# Patient Record
Sex: FEMALE | Race: OTHER | HISPANIC OR LATINO
[De-identification: names, ages, dates, MRNs, and addresses within clinical notes are randomized per-mention and may not be internally consistent; named-entity substitution may affect disease eponyms.]

---

## 2020-02-28 ENCOUNTER — APPOINTMENT (OUTPATIENT)
Dept: GASTROENTEROLOGY | Facility: CLINIC | Age: 45
End: 2020-02-28
Payer: COMMERCIAL

## 2020-02-28 VITALS
WEIGHT: 194 LBS | BODY MASS INDEX: 32.32 KG/M2 | DIASTOLIC BLOOD PRESSURE: 95 MMHG | SYSTOLIC BLOOD PRESSURE: 163 MMHG | HEIGHT: 65 IN | HEART RATE: 72 BPM

## 2020-02-28 DIAGNOSIS — Z80.49 FAMILY HISTORY OF MALIGNANT NEOPLASM OF OTHER GENITAL ORGANS: ICD-10-CM

## 2020-02-28 DIAGNOSIS — Z80.3 FAMILY HISTORY OF MALIGNANT NEOPLASM OF BREAST: ICD-10-CM

## 2020-02-28 DIAGNOSIS — Z86.79 PERSONAL HISTORY OF OTHER DISEASES OF THE CIRCULATORY SYSTEM: ICD-10-CM

## 2020-02-28 PROCEDURE — 99204 OFFICE O/P NEW MOD 45 MIN: CPT

## 2020-02-28 NOTE — ASSESSMENT
[FreeTextEntry1] : Impression: Worsening chronic GERD with postprandial dyspepsia rule out reflux of Heath's esophagitis versus ulcer disease versus gastritis versus possible H. pylori infection. Chronic constipation without alarm symptoms.\par \par Recommendations: MiraLax once daily was recommended for initial treatment of her constipation pending upper endoscopy which was advised for further evaluation of her chronic worsening GERD. The risks versus benefits of upper endoscopy and intravenous sedation, and alternative testing such as upper GI series, were individually explained to the patient in Thai by myself who speaks Thai. She appeared to understand all of the above and was agreeable to proceeding with repeat upper endoscopy. Her ASA classification is 2 and she is medically optimized for the proposed upper endoscopy and appeared to understand all of the above instructions and management plan.\par

## 2020-02-28 NOTE — HISTORY OF PRESENT ILLNESS
[None] : had no significant interval events [Heartburn] : heartburn worsened [Nausea] : denies nausea [Vomiting] : denies vomiting [Diarrhea] : denies diarrhea [Yellow Skin Or Eyes (Jaundice)] : denies jaundice [Abdominal Swelling] : denies abdominal swelling [Rectal Pain] : denies rectal pain [Constipation] : constipation [Abdominal Pain] : abdominal pain [GERD] : gastroesophageal reflux disease [Cholelithiasis] : cholelithiasis [Abdominal Surgery] : abdominal surgery [Cholecystectomy] : cholecystectomy [Wt Gain ___ Lbs] : no recent weight gain [Wt Loss ___ Lbs] : no recent weight loss [Hiatus Hernia] : no hiatus hernia [Peptic Ulcer Disease] : no peptic ulcer disease [Pancreatitis] : no pancreatitis [Kidney Stone] : no kidney stone [Inflammatory Bowel Disease] : no inflammatory bowel disease [Irritable Bowel Syndrome] : no irritable bowel syndrome [Diverticulitis] : no diverticulitis [Alcohol Abuse] : no alcohol abuse [Malignancy] : no malignancy [Appendectomy] : no appendectomy [de-identified] : this is the patient's first visit here [de-identified] : Patient presents today for initial evaluation of chronic worsening GERD which has been present for the past 4-6 months despite taking pantoprazole once daily. Her last EGD was a few years ago prior to gastric sleeve surgery which was unremarkable. As a result of gastric sleeve surgery she lost roughly 100 pounds and her reflux was naturally much better. Lately she has regained roughly 27 pounds and has had worsening reflux. She also complains of upper abdominal pain and bloating after eating.In addition she also has been having new onset constipation but denies gross hematochezia or melena and has no family history of first-degree relatives with colon cancer or colon polyps.

## 2020-02-28 NOTE — REVIEW OF SYSTEMS
[Negative] : Heme/Lymph [As Noted in HPI] : as noted in HPI [Abdominal Pain] : abdominal pain [Constipation] : constipation [Heartburn] : heartburn [Vomiting] : no vomiting [Diarrhea] : no diarrhea [Melena] : no melena [FreeTextEntry7] : postprandial dyspepsia

## 2020-02-28 NOTE — PHYSICAL EXAM
[General Appearance - Alert] : alert [General Appearance - In No Acute Distress] : in no acute distress [General Appearance - Well Nourished] : well nourished [General Appearance - Well Developed] : well developed [General Appearance - Well-Appearing] : healthy appearing [Sclera] : the sclera and conjunctiva were normal [PERRL With Normal Accommodation] : pupils were equal in size, round, and reactive to light [Extraocular Movements] : extraocular movements were intact [Outer Ear] : the ears and nose were normal in appearance [Examination Of The Oral Cavity] : the lips and gums were normal [Oropharynx] : the oropharynx was normal [Neck Appearance] : the appearance of the neck was normal [Neck Cervical Mass (___cm)] : no neck mass was observed [Jugular Venous Distention Increased] : there was no jugular-venous distention [Thyroid Diffuse Enlargement] : the thyroid was not enlarged [Thyroid Nodule] : there were no palpable thyroid nodules [Auscultation Breath Sounds / Voice Sounds] : lungs were clear to auscultation bilaterally [Heart Rate And Rhythm] : heart rate was normal and rhythm regular [Heart Sounds] : normal S1 and S2 [Heart Sounds Gallop] : no gallops [Murmurs] : no murmurs [Heart Sounds Pericardial Friction Rub] : no pericardial rub [Bowel Sounds] : normal bowel sounds [Abdomen Soft] : soft [] : no hepato-splenomegaly [Abdomen Mass (___ Cm)] : no abdominal mass palpated [Epigastric] : in the epigastric area [No CVA Tenderness] : no ~M costovertebral angle tenderness [Abnormal Walk] : normal gait [Musculoskeletal - Swelling] : no joint swelling seen [Skin Color & Pigmentation] : normal skin color and pigmentation [Skin Turgor] : normal skin turgor [Oriented To Time, Place, And Person] : oriented to person, place, and time [Periumbilical] : not in the periumbilical area [Suprapubic] : not in the suprapubic area [RUQ] : not in the in the right upper quadrant [RLQ] : not in the right lower quadrant [LUQ] : not in the left upper quadrant [LLQ] : not in the left lower quadrant [FreeTextEntry1] : deferred at this time

## 2020-03-27 ENCOUNTER — APPOINTMENT (OUTPATIENT)
Dept: GASTROENTEROLOGY | Facility: GI CENTER | Age: 45
End: 2020-03-27

## 2021-08-24 ENCOUNTER — RESULT REVIEW (OUTPATIENT)
Age: 46
End: 2021-08-24

## 2022-04-20 ENCOUNTER — EMERGENCY (EMERGENCY)
Facility: HOSPITAL | Age: 47
LOS: 1 days | Discharge: HOME | End: 2022-04-20
Attending: EMERGENCY MEDICINE
Payer: MEDICAID

## 2022-04-20 VITALS
HEIGHT: 66 IN | RESPIRATION RATE: 20 BRPM | TEMPERATURE: 98 F | HEART RATE: 79 BPM | SYSTOLIC BLOOD PRESSURE: 138 MMHG | DIASTOLIC BLOOD PRESSURE: 88 MMHG | WEIGHT: 199.96 LBS | OXYGEN SATURATION: 98 %

## 2022-04-20 PROCEDURE — 99283 EMERGENCY DEPT VISIT LOW MDM: CPT

## 2022-04-20 PROCEDURE — 99282 EMERGENCY DEPT VISIT SF MDM: CPT

## 2022-04-20 NOTE — ED PROVIDER NOTE - ATTENDING APP SHARED VISIT CONTRIBUTION OF CARE
Dr. Lovett : I have personally seen and examined this patient at the bedside. I have fully participated in the care of this patient. I have reviewed all pertinent clinical information, including history, physical exam, plan and the PA's note and agree except as noted.   45 y/o F hx htn presents c/o pain/swelling to the back of neck x 2 weeks. pt tried ibuprofen at home w/o much relief.       Denies f/c/n/v/cp/sob/palpitations/cough/abd.pain/d/c/dysuria/hematuria. no  sick contacts/recent travel.    PE:  head; atraumatic normocephalic  neck: +mobile firm ttp cyst to the back of the neck on right side no erythema no increased warmth  eyes: perrla  Heart: rrr s1s2  lungs: ctab  abd: soft, nt nd + bs no rebound/guarding no cva ttp  le: no swelling no calf ttp  back: no midline cervical/thoracic/lumbar ttp      -->+cyst no erythema no warmth; most likely sebacious cyst based on bedside sono findings no abscess collection will advise warm compresses pain control surg or derm follow up for removal

## 2022-04-20 NOTE — ED PROVIDER NOTE - CARE PROVIDER_API CALL
Zac Everett)  Surgery  [address], [address]  [place] [address]  Phone: [phone]  Fax: [phone]  Follow Up Time:

## 2022-04-20 NOTE — ED PROVIDER NOTE - CLINICAL SUMMARY MEDICAL DECISION MAKING FREE TEXT BOX
pt with swelling/pain to posterior neck  bedside US shows sebaceous cyst  advised warm compresses, ibuprofen prn, f/u dermatology or surgeon for removal  pt agreeable with plan

## 2022-04-20 NOTE — ED PROVIDER NOTE - OBJECTIVE STATEMENT
47 y/o F hx htn presents c/o pain/swelling to posterior neck x 2 weeks causing pain and chills. pt tried ibuprofen at home w/o much relief. denies fevers, vomiting.

## 2022-04-20 NOTE — ED PROVIDER NOTE - NSFOLLOWUPCLINICS_GEN_ALL_ED_FT
[facility] - [place]  Dermatology  [address] [place] [id number]  Phone: [phone]  Fax: [phone]

## 2022-04-20 NOTE — ED PROVIDER NOTE - PATIENT PORTAL LINK FT
You can access the FollowMyHealth Patient Portal offered by [facility] by registering at the following website: [email or web address]. By joining [email or web address]’s FollowMyHealth portal, you will also be able to view your health information using other applications (apps) compatible with our system.

## 2022-04-20 NOTE — ED PROVIDER NOTE - NSFOLLOWUPINSTRUCTIONS_ED_ALL_ED_FT
Please take ibuprofen 600mg every 6 hours as needed for pain  Please take tylenol 650mg every 6hours as needed for pain  Apply warm compresses to area  Follow up with dermatologist or surgeon for removal  Return to ER for new or worsening symptoms    Homer C Jones ibuprofeno 600 mg cada 6 horas según sea necesario para el dolor  Homer C Jones tylenol 650 mg cada 6 horas según sea necesario para el dolor  Aplique compresas tibias en el área  Seguimiento con dermatólogo o cirujano para la extracción  Regrese a la hernán de emergencias por síntomas nuevos o que empeoran

## 2022-04-20 NOTE — ED PROCEDURE NOTE - PROCEDURE ADDITIONAL DETAILS
0.6cmx0.7xmx0.8cm round regular borders cystic structure with debris in the right side of the neck a little off midline - most consistent with sebacious cyst

## 2022-04-20 NOTE — ED PROVIDER NOTE - NS ED ATTENDING STATEMENT MOD
This was a shared visit with the JERONIMO. I reviewed and verified the documentation and independently performed the documented:

## 2022-04-20 NOTE — ED PROVIDER NOTE - PHYSICAL EXAMINATION
Gen: No acute distress, non toxic  HEENT: Mucous membranes moist, pink conjunctivae, EOMI  CV: RRR, nl s1/s2.  Resp: CTAB, normal rate and effort  GI: Abdomen soft, NT, ND. No rebound, no guarding  : No CVAT  Neuro: A&O x 3, moving all 4 extremities  MSK: No spine or joint tenderness to palpation  Skin: 0.8cm area of swelling right posterior neck, tender to palpation, no erythema/warmth, neck supple

## 2022-05-17 ENCOUNTER — APPOINTMENT (OUTPATIENT)
Dept: GASTROENTEROLOGY | Facility: CLINIC | Age: 47
End: 2022-05-17

## 2022-07-06 ENCOUNTER — APPOINTMENT (OUTPATIENT)
Dept: GASTROENTEROLOGY | Facility: CLINIC | Age: 47
End: 2022-07-06

## 2022-07-13 ENCOUNTER — APPOINTMENT (OUTPATIENT)
Dept: GASTROENTEROLOGY | Facility: CLINIC | Age: 47
End: 2022-07-13

## 2022-07-13 VITALS
WEIGHT: 192 LBS | BODY MASS INDEX: 31.99 KG/M2 | OXYGEN SATURATION: 98 % | RESPIRATION RATE: 14 BRPM | DIASTOLIC BLOOD PRESSURE: 79 MMHG | HEART RATE: 81 BPM | SYSTOLIC BLOOD PRESSURE: 126 MMHG | HEIGHT: 65 IN

## 2022-07-13 PROCEDURE — 99214 OFFICE O/P EST MOD 30 MIN: CPT

## 2022-07-13 NOTE — REVIEW OF SYSTEMS
[As Noted in HPI] : as noted in HPI [Abdominal Pain] : abdominal pain [Heartburn] : heartburn [Negative] : Heme/Lymph [Vomiting] : no vomiting [Constipation] : no constipation [Diarrhea] : no diarrhea [Melena] : no melena [FreeTextEntry7] : Postprandial dyspepsia

## 2022-07-13 NOTE — ASSESSMENT
[FreeTextEntry1] : Impression: Postprandial dyspepsia with chronic GERD rule out reflux of Heath's esophagitis and/or gastritis and/or ulcer disease and/or H. pylori infection.  Colon cancer screening rule out colonic neoplasm.\par \par Recommendations: EGD and low risk screening colonoscopy were advised for further evaluation of the above.  The risk versus benefits of upper endoscopy and colonoscopy and intravenous sedation, and alternative testing such as upper GI series virtual colonoscopy, were individually explained to the patient today in Romanian by myself who speaks Romanian.  She appeared to understand all of the above and was agreeable to proceeding with both procedures.  Her ASA classification is 2.  She will be prepped with GoLytely and is medically optimized for the proposed upper endoscopy and colonoscopy and seemed to understand all of the above instructions, information, and management plan.

## 2022-07-13 NOTE — HISTORY OF PRESENT ILLNESS
[Heartburn] : heartburn worsened [Nausea] : denies nausea [Vomiting] : denies vomiting [Diarrhea] : denies diarrhea [Constipation] : denies constipation [Yellow Skin Or Eyes (Jaundice)] : denies jaundice [Abdominal Pain] : abdominal pain worsened [Abdominal Swelling] : denies abdominal swelling [Rectal Pain] : denies rectal pain [GERD] : gastroesophageal reflux disease [Cholelithiasis] : cholelithiasis [Abdominal Surgery] : abdominal surgery [Cholecystectomy] : cholecystectomy [Wt Gain ___ Lbs] : no recent weight gain [Wt Loss ___ Lbs] : no recent weight loss [Hiatus Hernia] : no hiatus hernia [Peptic Ulcer Disease] : no peptic ulcer disease [Pancreatitis] : no pancreatitis [Kidney Stone] : no kidney stone [Inflammatory Bowel Disease] : no inflammatory bowel disease [Irritable Bowel Syndrome] : no irritable bowel syndrome [Diverticulitis] : no diverticulitis [Alcohol Abuse] : no alcohol abuse [Malignancy] : no malignancy [Appendectomy] : no appendectomy [de-identified] : February 2020 [de-identified] : Recommending EGD [de-identified] : COVID pandemic [de-identified] : Patient presents for follow-up evaluation of chronic postprandial dyspepsia which has been present for at least the last 2 years.  She was seen here in February 2020 for this problem and upper endoscopy was recommended but not done because of the COVID pandemic.  She returns today with the same problem with postprandial bloating and dyspepsia and also is in need of a screening colonoscopy given that she is 47 years old.  She has had no previous upper endoscopies and states that she had a colonoscopy done more than 10 years ago for lower GI symptoms.  She does have some intermittent reflux symptoms as well after eating

## 2022-07-26 ENCOUNTER — EMERGENCY (EMERGENCY)
Facility: HOSPITAL | Age: 47
LOS: 1 days | Discharge: HOME | End: 2022-07-26
Attending: STUDENT IN AN ORGANIZED HEALTH CARE EDUCATION/TRAINING PROGRAM
Payer: COMMERCIAL

## 2022-07-26 VITALS
DIASTOLIC BLOOD PRESSURE: 60 MMHG | RESPIRATION RATE: 20 BRPM | TEMPERATURE: 98 F | HEIGHT: 66 IN | HEART RATE: 73 BPM | WEIGHT: 199.96 LBS | OXYGEN SATURATION: 98 % | SYSTOLIC BLOOD PRESSURE: 96 MMHG

## 2022-07-26 VITALS
HEART RATE: 60 BPM | DIASTOLIC BLOOD PRESSURE: 78 MMHG | RESPIRATION RATE: 20 BRPM | TEMPERATURE: 98 F | SYSTOLIC BLOOD PRESSURE: 125 MMHG | OXYGEN SATURATION: 98 %

## 2022-07-26 LAB
ALBUMIN SERPL ELPH-MCNC: 3.8 G/DL — SIGNIFICANT CHANGE UP (ref 3.3–5.2)
ALP SERPL-CCNC: 91 U/L — SIGNIFICANT CHANGE UP (ref 40–120)
ALT FLD-CCNC: 17 U/L — SIGNIFICANT CHANGE UP
ANION GAP SERPL CALC-SCNC: 10 MMOL/L — SIGNIFICANT CHANGE UP (ref 5–17)
APPEARANCE UR: ABNORMAL
AST SERPL-CCNC: 24 U/L — SIGNIFICANT CHANGE UP
BACTERIA # UR AUTO: ABNORMAL
BASOPHILS # BLD AUTO: 0.03 K/UL — SIGNIFICANT CHANGE UP (ref 0–0.2)
BASOPHILS NFR BLD AUTO: 0.3 % — SIGNIFICANT CHANGE UP (ref 0–2)
BILIRUB SERPL-MCNC: <0.2 MG/DL — LOW (ref 0.4–2)
BILIRUB UR-MCNC: NEGATIVE — SIGNIFICANT CHANGE UP
BUN SERPL-MCNC: 18.3 MG/DL — SIGNIFICANT CHANGE UP (ref 8–20)
CALCIUM SERPL-MCNC: 8.5 MG/DL — SIGNIFICANT CHANGE UP (ref 8.4–10.5)
CHLORIDE SERPL-SCNC: 105 MMOL/L — SIGNIFICANT CHANGE UP (ref 98–107)
CO2 SERPL-SCNC: 25 MMOL/L — SIGNIFICANT CHANGE UP (ref 22–29)
COLOR SPEC: YELLOW — SIGNIFICANT CHANGE UP
CREAT SERPL-MCNC: 1.1 MG/DL — SIGNIFICANT CHANGE UP (ref 0.5–1.3)
DIFF PNL FLD: ABNORMAL
EGFR: 63 ML/MIN/1.73M2 — SIGNIFICANT CHANGE UP
EOSINOPHIL # BLD AUTO: 0.22 K/UL — SIGNIFICANT CHANGE UP (ref 0–0.5)
EOSINOPHIL NFR BLD AUTO: 1.8 % — SIGNIFICANT CHANGE UP (ref 0–6)
EPI CELLS # UR: SIGNIFICANT CHANGE UP
GLUCOSE SERPL-MCNC: 74 MG/DL — SIGNIFICANT CHANGE UP (ref 70–99)
GLUCOSE UR QL: NEGATIVE MG/DL — SIGNIFICANT CHANGE UP
HCG SERPL-ACNC: <4 MIU/ML — SIGNIFICANT CHANGE UP
HCT VFR BLD CALC: 34.5 % — SIGNIFICANT CHANGE UP (ref 34.5–45)
HGB BLD-MCNC: 11.1 G/DL — LOW (ref 11.5–15.5)
IMM GRANULOCYTES NFR BLD AUTO: 0.3 % — SIGNIFICANT CHANGE UP (ref 0–1.5)
KETONES UR-MCNC: NEGATIVE — SIGNIFICANT CHANGE UP
LEUKOCYTE ESTERASE UR-ACNC: ABNORMAL
LIDOCAIN IGE QN: 37 U/L — SIGNIFICANT CHANGE UP (ref 22–51)
LYMPHOCYTES # BLD AUTO: 27.4 % — SIGNIFICANT CHANGE UP (ref 13–44)
LYMPHOCYTES # BLD AUTO: 3.27 K/UL — SIGNIFICANT CHANGE UP (ref 1–3.3)
MAGNESIUM SERPL-MCNC: 2.1 MG/DL — SIGNIFICANT CHANGE UP (ref 1.6–2.6)
MCHC RBC-ENTMCNC: 26.2 PG — LOW (ref 27–34)
MCHC RBC-ENTMCNC: 32.2 GM/DL — SIGNIFICANT CHANGE UP (ref 32–36)
MCV RBC AUTO: 81.6 FL — SIGNIFICANT CHANGE UP (ref 80–100)
MONOCYTES # BLD AUTO: 0.75 K/UL — SIGNIFICANT CHANGE UP (ref 0–0.9)
MONOCYTES NFR BLD AUTO: 6.3 % — SIGNIFICANT CHANGE UP (ref 2–14)
NEUTROPHILS # BLD AUTO: 7.62 K/UL — HIGH (ref 1.8–7.4)
NEUTROPHILS NFR BLD AUTO: 63.9 % — SIGNIFICANT CHANGE UP (ref 43–77)
NITRITE UR-MCNC: NEGATIVE — SIGNIFICANT CHANGE UP
PH UR: 5 — SIGNIFICANT CHANGE UP (ref 5–8)
PLATELET # BLD AUTO: 378 K/UL — SIGNIFICANT CHANGE UP (ref 150–400)
POTASSIUM SERPL-MCNC: 4.3 MMOL/L — SIGNIFICANT CHANGE UP (ref 3.5–5.3)
POTASSIUM SERPL-SCNC: 4.3 MMOL/L — SIGNIFICANT CHANGE UP (ref 3.5–5.3)
PROT SERPL-MCNC: 6.3 G/DL — LOW (ref 6.6–8.7)
PROT UR-MCNC: 30 MG/DL
RBC # BLD: 4.23 M/UL — SIGNIFICANT CHANGE UP (ref 3.8–5.2)
RBC # FLD: 17 % — HIGH (ref 10.3–14.5)
RBC CASTS # UR COMP ASSIST: SIGNIFICANT CHANGE UP /HPF (ref 0–4)
SODIUM SERPL-SCNC: 140 MMOL/L — SIGNIFICANT CHANGE UP (ref 135–145)
SP GR SPEC: 1.01 — SIGNIFICANT CHANGE UP (ref 1.01–1.02)
UROBILINOGEN FLD QL: NEGATIVE MG/DL — SIGNIFICANT CHANGE UP
WBC # BLD: 11.92 K/UL — HIGH (ref 3.8–10.5)
WBC # FLD AUTO: 11.92 K/UL — HIGH (ref 3.8–10.5)
WBC UR QL: ABNORMAL /HPF (ref 0–5)

## 2022-07-26 PROCEDURE — 81001 URINALYSIS AUTO W/SCOPE: CPT

## 2022-07-26 PROCEDURE — 99285 EMERGENCY DEPT VISIT HI MDM: CPT

## 2022-07-26 PROCEDURE — 84702 CHORIONIC GONADOTROPIN TEST: CPT

## 2022-07-26 PROCEDURE — 83690 ASSAY OF LIPASE: CPT

## 2022-07-26 PROCEDURE — 96374 THER/PROPH/DIAG INJ IV PUSH: CPT | Mod: XU

## 2022-07-26 PROCEDURE — 36415 COLL VENOUS BLD VENIPUNCTURE: CPT

## 2022-07-26 PROCEDURE — 85025 COMPLETE CBC W/AUTO DIFF WBC: CPT

## 2022-07-26 PROCEDURE — 87086 URINE CULTURE/COLONY COUNT: CPT

## 2022-07-26 PROCEDURE — 83735 ASSAY OF MAGNESIUM: CPT

## 2022-07-26 PROCEDURE — 74177 CT ABD & PELVIS W/CONTRAST: CPT | Mod: MA

## 2022-07-26 PROCEDURE — 80053 COMPREHEN METABOLIC PANEL: CPT

## 2022-07-26 PROCEDURE — 74177 CT ABD & PELVIS W/CONTRAST: CPT | Mod: 26,MA

## 2022-07-26 PROCEDURE — 99284 EMERGENCY DEPT VISIT MOD MDM: CPT | Mod: 25

## 2022-07-26 RX ORDER — IBUPROFEN 200 MG
1 TABLET ORAL
Qty: 20 | Refills: 0
Start: 2022-07-26

## 2022-07-26 RX ORDER — KETOROLAC TROMETHAMINE 30 MG/ML
30 SYRINGE (ML) INJECTION ONCE
Refills: 0 | Status: DISCONTINUED | OUTPATIENT
Start: 2022-07-26 | End: 2022-07-26

## 2022-07-26 RX ORDER — CEPHALEXIN 500 MG
500 CAPSULE ORAL EVERY 12 HOURS
Refills: 0 | Status: DISCONTINUED | OUTPATIENT
Start: 2022-07-26 | End: 2022-07-31

## 2022-07-26 RX ORDER — CEPHALEXIN 500 MG
1 CAPSULE ORAL
Qty: 14 | Refills: 0
Start: 2022-07-26 | End: 2022-08-01

## 2022-07-26 RX ADMIN — Medication 500 MILLIGRAM(S): at 18:00

## 2022-07-26 RX ADMIN — Medication 30 MILLIGRAM(S): at 18:00

## 2022-07-26 NOTE — ED STATDOCS - PATIENT PORTAL LINK FT
You can access the FollowMyHealth Patient Portal offered by [facility] by registering at the following website: [email or web address]. By joining [employer]’s FollowMyHealth portal, you will also be able to view your health information using other applications (apps) compatible with our system.

## 2022-07-26 NOTE — ED ADULT TRIAGE NOTE - HEART RATE (BEATS/MIN)
Patient has appt tomorrow 1/13/2017 needs a referral put in system  And faxed to :[phone]    [facility]  Dx: eye exam, hx of melanoma    74

## 2022-07-26 NOTE — ED STATDOCS - NSFOLLOWUPINSTRUCTIONS_ED_ALL_ED_FT
Please take antibiotics as prescribed for UTI  Please take ibuprofen 600mg every 6 hours as needed for pain  Please take tylenol 650mg every 6hours as needed for pain  Follow up with gynecologist within 1 week  Return to ER for fevers, worsening pain, vomiting, or other new or worsening symptoms        Ovarian Cyst    WHAT YOU NEED TO KNOW:    What is an ovarian cyst? An ovarian cyst is a fluid-filled sac that grows in or on an ovary. You have 2 ovaries, 1 on each side of your uterus. They are small, about the size and shape of an almond. Ovarian cysts are common in women who have regular monthly cycles. During your monthly cycle, eggs are released from the ovaries. The cyst usually contains fluid but may sometimes have blood or tissue in it. Most ovarian cysts are harmless and go away without treatment in a few months. Some cysts can grow large, cause pain, or break open.   Female Reproductive System         What causes an ovarian cyst?   •Problems with your hormones      •Medicines that help you ovulate      •Endometriosis      •Pregnancy      •A severe infection in your pelvis      What are the signs and symptoms of an ovarian cyst? You may have pressure, bloating or swelling in your lower abdomen on the side of the cyst. You may also have dull or sharp pain that may come and go. The following are less common signs and symptoms:   •A dull ache in your lower back and thighs      •Unusual vaginal bleeding      •Weight gain you did not expect or plan      •Pain during your monthly cycle      •Tenderness in your breasts      •Trouble completely emptying your bowels or bladder      •The need to urinate often      •Pain during sex      How is an ovarian cyst diagnosed?   •Blood tests may show what type of cyst you have and if you need treatment.      •A pelvic exam may help your healthcare provider feel an ovarian cyst.      •A pelvic ultrasound may show a cyst on your ovary. An ultrasound wand uses sound waves to show pictures on a monitor. The wand is inserted into your vagina and guided up toward your uterus.       How is an ovarian cyst treated? Treatment will depend on your age, symptoms, and the kind of cyst you have. You may need any of the following:   •Watchful waiting may be recommended. This means the cyst is not treated right away. You will need to watch for any signs or symptoms that the cyst is growing. You may need to return for one or more ultrasounds after a certain period of time. These will show if your cyst has changed in size.      •Medicines: ?Birth control pills may help control your monthly cycle, prevent cysts, or cause them to shrink.      ?Acetaminophen decreases pain and fever. It is available without a doctor's order. Ask how much to take and how often to take it. Follow directions. Read the labels of all other medicines you are using to see if they also contain acetaminophen, or ask your doctor or pharmacist. Acetaminophen can cause liver damage if not taken correctly.      ?NSAIDs, such as ibuprofen, help decrease swelling, pain, and fever. This medicine is available with or without a doctor's order. NSAIDs can cause stomach bleeding or kidney problems in certain people. If you take blood thinner medicine, always ask your healthcare provider if NSAIDs are safe for you. Always read the medicine label and follow directions.      ?Prescription pain medicine may be given. Ask your healthcare provider how to take this medicine safely. Some prescription pain medicines contain acetaminophen. Do not take other medicines that contain acetaminophen without talking to your healthcare provider. Too much acetaminophen may cause liver damage. Prescription pain medicine may cause constipation. Ask your healthcare provider how to prevent or treat constipation.       •Surgery may be needed to remove the ovarian cyst.      How can I manage ovarian cysts? You can manage a current cyst and help healthcare providers find future cysts early.  •Apply heat to decrease pain and cramping from a cyst. Sit in a warm bath, or place a heating pad (turned on low) on your abdomen. Do this for 15 to 20 minutes every hour for comfort.      •Get regular pelvic exams or Pap smears. This will help providers find any new ovarian cysts. Tell your healthcare provider about any unusual changes in your monthly cycle.      Call your local emergency number (911 in the [place]) if:   •You have severe pain with fever and vomiting.      •You have sudden, severe abdominal pain.      •You are too weak, faint, or dizzy to stand up.      •You are breathing very quickly.      When should I call my doctor?   •Your periods are early, late, or more painful than usual.      •You have questions or concerns about your condition or care.      CARE AGREEMENT:    You have the right to help plan your care. Learn about your health condition and how it may be treated. Discuss treatment options with your healthcare providers to decide what care you want to receive. You always have the right to refuse treatment.

## 2022-07-26 NOTE — ED STATDOCS - OBJECTIVE STATEMENT
46 y/o female with PMHx of HTN presents with sudden onset of suprapubic pain for three days, and cloudy urine that started today. Describes the pain as if she is going to have a baby. LMP two months ago. Denies pregnancy. PSHx of cholecystectomy, hernia repair, tubal ligation, bariatric surgery. Pt denies fevers/chills, ha, loc, focal neuro deficits, cp/sob/palp, cough, abd pain/n/v/d, urinary symptoms, recent travel and sick contacts.

## 2022-07-26 NOTE — ED STATDOCS - CARE PROVIDER_API CALL
Mor Chase)  Obstetrics and Gynecology  [address], [address]  [place] [address]  Phone: [phone]  Fax: [phone]  Follow Up Time:

## 2022-07-26 NOTE — ED STATDOCS - CLINICAL SUMMARY MEDICAL DECISION MAKING FREE TEXT BOX
48 y/o female with PMHx of HTN presents with sudden onset of suprapubic pain for three days, and cloudy urine that started today. Rule out diverticulitis, as well as UTI. Will do labs, UA, CT.

## 2022-07-26 NOTE — ED STATDOCS - PHYSICAL EXAMINATION
Const: Awake, alert and oriented. In no acute distress. Well appearing.  HEENT: NC/AT. Moist mucous membranes.  Eyes: No scleral icterus. EOMI.  Neck:. Soft and supple. Full ROM without pain.  Cardiac: Regular rate and regular rhythm. +S1/S2. Peripheral pulses 2+ and symmetric. No LE edema.  Resp: Speaking in full sentences. No evidence of respiratory distress. No wheezes, rales or rhonchi.  Abd: (+) LLQ and suprapubic TTP. Soft, non-distended. Normal bowel sounds in all 4 quadrants. No guarding or rebound.  Back: Spine midline and non-tender. No CVAT.  Skin: No rashes, abrasions or lacerations.  Lymph: No cervical lymphadenopathy.  Neuro: Awake, alert & oriented x 3. Moves all extremities symmetrically.

## 2022-07-26 NOTE — ED STATDOCS - PROGRESS NOTE DETAILS
Domonique Oro PA :  PT evaluated by intake physician. HPI/PE/ROS as noted above. Will follow up plan per intake physician  ?UTI  CT with b/l ovarian cysts  pt feeling better after toradol  will dc on abx for UTI and nsaids and f/u with GYN  return precautions discussed   oz

## 2022-07-28 LAB
CULTURE RESULTS: SIGNIFICANT CHANGE UP
SPECIMEN SOURCE: SIGNIFICANT CHANGE UP

## 2022-09-04 ENCOUNTER — EMERGENCY (EMERGENCY)
Facility: HOSPITAL | Age: 47
LOS: 1 days | Discharge: HOME | End: 2022-09-04
Attending: EMERGENCY MEDICINE
Payer: COMMERCIAL

## 2022-09-04 VITALS
RESPIRATION RATE: 16 BRPM | SYSTOLIC BLOOD PRESSURE: 130 MMHG | HEART RATE: 69 BPM | DIASTOLIC BLOOD PRESSURE: 87 MMHG | OXYGEN SATURATION: 100 % | TEMPERATURE: 98 F

## 2022-09-04 VITALS
WEIGHT: 179.9 LBS | HEIGHT: 66 IN | SYSTOLIC BLOOD PRESSURE: 137 MMHG | RESPIRATION RATE: 16 BRPM | TEMPERATURE: 98 F | HEART RATE: 89 BPM | DIASTOLIC BLOOD PRESSURE: 93 MMHG | OXYGEN SATURATION: 100 %

## 2022-09-04 PROCEDURE — 99284 EMERGENCY DEPT VISIT MOD MDM: CPT | Mod: 25

## 2022-09-04 PROCEDURE — 99284 EMERGENCY DEPT VISIT MOD MDM: CPT

## 2022-09-04 PROCEDURE — 96372 THER/PROPH/DIAG INJ SC/IM: CPT

## 2022-09-04 PROCEDURE — 70450 CT HEAD/BRAIN W/O DYE: CPT | Mod: MA

## 2022-09-04 PROCEDURE — 70450 CT HEAD/BRAIN W/O DYE: CPT | Mod: 26,MA

## 2022-09-04 RX ORDER — SUMATRIPTAN SUCCINATE 4 MG/.5ML
50 INJECTION, SOLUTION SUBCUTANEOUS ONCE
Refills: 0 | Status: COMPLETED | OUTPATIENT
Start: 2022-09-04 | End: 2022-09-04

## 2022-09-04 RX ORDER — KETOROLAC TROMETHAMINE 30 MG/ML
30 SYRINGE (ML) INJECTION ONCE
Refills: 0 | Status: DISCONTINUED | OUTPATIENT
Start: 2022-09-04 | End: 2022-09-04

## 2022-09-04 RX ORDER — ACETAMINOPHEN 500 MG
975 TABLET ORAL ONCE
Refills: 0 | Status: COMPLETED | OUTPATIENT
Start: 2022-09-04 | End: 2022-09-04

## 2022-09-04 RX ADMIN — SUMATRIPTAN SUCCINATE 50 MILLIGRAM(S): 4 INJECTION, SOLUTION SUBCUTANEOUS at 13:26

## 2022-09-04 RX ADMIN — Medication 30 MILLIGRAM(S): at 13:28

## 2022-09-04 RX ADMIN — Medication 975 MILLIGRAM(S): at 13:26

## 2022-09-04 NOTE — ED STATDOCS - PATIENT PORTAL LINK FT
You can access the FollowMyHealth Patient Portal offered by [facility] by registering at the following website: [email or web address]. By joining [employer]’s FollowMyHealth portal, you will also be able to view your health information using other applications (apps) compatible with our system.

## 2022-09-04 NOTE — ED STATDOCS - ENMT, MLM
Nasal mucosa clear.  Mouth with normal mucosa  Throat has no vesicles, no oropharyngeal exudates and uvula is midline. 79

## 2022-09-04 NOTE — ED STATDOCS - CARE PROVIDER_API CALL
Italo Serra)  Neurology  [address], [address]  [place] [address]  Phone: [phone]  Fax: [phone]  Follow Up Time: 7-10 Days

## 2022-09-04 NOTE — ED STATDOCS - ATTENDING APP SHARED VISIT CONTRIBUTION OF CARE
I, Kamar Delcid, performed the initial face to face bedside interview with this patient regarding history of present illness, review of symptoms and relevant past medical, social and family history.  I completed an independent physical examination.  I was the initial provider who evaluated this patient. I have signed out the follow up of any pending tests (i.e. labs, radiological studies) to the ACP.  I have communicated the patient’s plan of care and disposition with the ACP.

## 2022-09-04 NOTE — ED STATDOCS - OBJECTIVE STATEMENT
46 y/o female with a PMHx of HTN, presents to the ED c/o headache that began today. States she took her BP at home today and it was in the 200s, so she took BP medication (pt unsure of name). BP in ED is 126/84. Pt also took ibuprofen for headache but denies any symptoms relief. 46 y/o female with a PMHx of HTN, presents to the ED c/o headache that began today. States she took her BP at home today and it was in the 200s, so she took BP medication (pt unsure of name). BP in ED is 126/84. Pt also took ibuprofen for headache but denies any symptom relief.

## 2022-09-04 NOTE — ED ADULT NURSE REASSESSMENT NOTE - NS ED NURSE REASSESS COMMENT FT1
pt resting comfortably in RW, pt complaining of 10/10 headache starting this morning, pt denies nausea/vomiting, states her BP was high at home, awaiting CT results at this time

## 2022-09-23 LAB — SARS-COV-2 N GENE NPH QL NAA+PROBE: NOT DETECTED

## 2022-09-25 ENCOUNTER — TRANSCRIPTION ENCOUNTER (OUTPATIENT)
Age: 47
End: 2022-09-25

## 2022-09-26 ENCOUNTER — APPOINTMENT (OUTPATIENT)
Dept: GASTROENTEROLOGY | Facility: GI CENTER | Age: 47
End: 2022-09-26

## 2022-09-26 ENCOUNTER — RESULT REVIEW (OUTPATIENT)
Age: 47
End: 2022-09-26

## 2022-09-26 ENCOUNTER — OUTPATIENT (OUTPATIENT)
Dept: OUTPATIENT SERVICES | Facility: HOSPITAL | Age: 47
LOS: 1 days | End: 2022-09-26
Payer: COMMERCIAL

## 2022-09-26 DIAGNOSIS — Z12.11 ENCOUNTER FOR SCREENING FOR MALIGNANT NEOPLASM OF COLON: ICD-10-CM

## 2022-09-26 DIAGNOSIS — R10.13 EPIGASTRIC PAIN: ICD-10-CM

## 2022-09-26 PROCEDURE — 88342 IMHCHEM/IMCYTCHM 1ST ANTB: CPT

## 2022-09-26 PROCEDURE — 43239 EGD BIOPSY SINGLE/MULTIPLE: CPT

## 2022-09-26 PROCEDURE — 88305 TISSUE EXAM BY PATHOLOGIST: CPT

## 2022-09-26 PROCEDURE — 88342 IMHCHEM/IMCYTCHM 1ST ANTB: CPT | Mod: 26

## 2022-09-26 PROCEDURE — 88305 TISSUE EXAM BY PATHOLOGIST: CPT | Mod: 26

## 2022-09-26 PROCEDURE — G0121: CPT

## 2022-09-26 PROCEDURE — 45378 DIAGNOSTIC COLONOSCOPY: CPT | Mod: 33

## 2022-09-26 PROCEDURE — 43239 EGD BIOPSY SINGLE/MULTIPLE: CPT | Mod: 59

## 2022-09-26 RX ORDER — LISINOPRIL AND HYDROCHLOROTHIAZIDE TABLETS 20; 25 MG/1; MG/1
20-25 TABLET ORAL
Qty: 90 | Refills: 0 | Status: ACTIVE | COMMUNITY
Start: 2022-09-08

## 2022-09-26 NOTE — PHYSICAL EXAM
[Alert] : alert [Normal Voice/Communication] : normal voice/communication [Healthy Appearing] : healthy appearing [No Acute Distress] : no acute distress [Well Developed] : well developed [Well Nourished] : well nourished [Sclera] : the sclera and conjunctiva were normal [Hearing Threshold Finger Rub Not [place]] : hearing was normal [Normal Lips/Gums] : the lips and gums were normal [Oropharynx] : the oropharynx was normal [Normal Appearance] : the appearance of the neck was normal [No Neck Mass] : no neck mass was observed [No Respiratory Distress] : no respiratory distress [No Acc Muscle Use] : no accessory muscle use [Respiration, Rhythm And Depth] : normal respiratory rhythm and effort [Auscultation Breath Sounds / Voice Sounds] : lungs were clear to auscultation bilaterally [Heart Rate And Rhythm] : heart rate was normal and rhythm regular [Normal S1, S2] : normal S1 and S2 [Murmurs] : no murmurs [Bowel Sounds] : normal bowel sounds [Abdomen Tenderness] : non-tender [No Masses] : no abdominal mass palpated [Abdomen Soft] : soft [] : no hepatosplenomegaly [Oriented To Time, Place, And Person] : oriented to person, place, and time

## 2022-09-26 NOTE — REVIEW OF SYSTEMS
[Abdominal Pain] : abdominal pain [Heartburn] : heartburn [Negative] : Heme/Lymph [Vomiting] : no vomiting [Constipation] : no constipation [Diarrhea] : no diarrhea [Melena (black stool)] : no melena [Bleeding] : no bleeding [Fecal Incontinence (soiling)] : no fecal incontinence [Bloating (gassiness)] : no bloating [FreeTextEntry7] : dyspepsia

## 2022-09-28 LAB — SURGICAL PATHOLOGY STUDY: SIGNIFICANT CHANGE UP

## 2022-11-14 ENCOUNTER — OFFICE (OUTPATIENT)
Dept: URBAN - METROPOLITAN AREA CLINIC 63 | Facility: CLINIC | Age: 47
Setting detail: OPHTHALMOLOGY
End: 2022-11-14
Payer: COMMERCIAL

## 2022-11-14 DIAGNOSIS — H04.123: ICD-10-CM

## 2022-11-14 DIAGNOSIS — H52.4: ICD-10-CM

## 2022-11-14 DIAGNOSIS — H40.013: ICD-10-CM

## 2022-11-14 DIAGNOSIS — H35.033: ICD-10-CM

## 2022-11-14 PROCEDURE — 92133 CPTRZD OPH DX IMG PST SGM ON: CPT | Performed by: STUDENT IN AN ORGANIZED HEALTH CARE EDUCATION/TRAINING PROGRAM

## 2022-11-14 PROCEDURE — 92012 INTRM OPH EXAM EST PATIENT: CPT | Performed by: STUDENT IN AN ORGANIZED HEALTH CARE EDUCATION/TRAINING PROGRAM

## 2022-11-14 PROCEDURE — 92015 DETERMINE REFRACTIVE STATE: CPT | Performed by: STUDENT IN AN ORGANIZED HEALTH CARE EDUCATION/TRAINING PROGRAM

## 2022-11-14 ASSESSMENT — REFRACTION_CURRENTRX
OS_SPHERE: +0.50
OD_OVR_VA: 20/
OS_VPRISM_DIRECTION: SV
OD_CYLINDER: -0.25
OD_AXIS: 131
OD_ADD: +1.75
OD_SPHERE: PLANO
OD_VPRISM_DIRECTION: SV
OS_AXIS: 088
OS_ADD: +1.75
OS_CYLINDER: -0.75
OS_OVR_VA: 20/

## 2022-11-14 ASSESSMENT — REFRACTION_AUTOREFRACTION
OS_AXIS: 097
OD_CYLINDER: -0.50
OD_SPHERE: +0.50
OD_AXIS: 106
OS_SPHERE: +0.75
OS_CYLINDER: -0.25

## 2022-11-14 ASSESSMENT — REFRACTION_MANIFEST
OS_AXIS: 095
OD_ADD: +1.75
OS_VA1: 20/20
OD_VA1: 20/20
OD_CYLINDER: -0.50
OS_SPHERE: +0.75
OD_AXIS: 105
OS_ADD: +1.75
OS_CYLINDER: -0.25
OD_SPHERE: +0.50

## 2022-11-14 ASSESSMENT — KERATOMETRY
OD_AXISANGLE_DEGREES: 071
OS_AXISANGLE_DEGREES: 078
OD_K1POWER_DIOPTERS: 44.75
OS_K1POWER_DIOPTERS: 45.00
OD_K2POWER_DIOPTERS: 45.50
OS_K2POWER_DIOPTERS: 45.75

## 2022-11-14 ASSESSMENT — AXIALLENGTH_DERIVED
OS_AL: 22.6951
OD_AL: 22.9175
OD_AL: 22.9175
OS_AL: 22.6951

## 2022-11-14 ASSESSMENT — VISUAL ACUITY
OS_BCVA: 20/20
OD_BCVA: 20/20

## 2022-11-14 ASSESSMENT — SPHEQUIV_DERIVED
OS_SPHEQUIV: 0.625
OD_SPHEQUIV: 0.25
OS_SPHEQUIV: 0.625
OD_SPHEQUIV: 0.25

## 2022-11-14 ASSESSMENT — TEAR BREAK UP TIME (TBUT)
OS_TBUT: T
OD_TBUT: T

## 2022-11-14 ASSESSMENT — TONOMETRY
OS_IOP_MMHG: 16
OD_IOP_MMHG: 16

## 2022-11-14 ASSESSMENT — PACHYMETRY
OS_CT_CORRECTION: -3
OD_CT_UM: 610
OD_CT_CORRECTION: -5
OS_CT_UM: 580

## 2022-11-14 ASSESSMENT — CONFRONTATIONAL VISUAL FIELD TEST (CVF)
OD_FINDINGS: FULL
OS_FINDINGS: FULL

## 2023-01-19 ENCOUNTER — APPOINTMENT (OUTPATIENT)
Dept: NEUROLOGY | Facility: CLINIC | Age: 48
End: 2023-01-19
Payer: MEDICAID

## 2023-01-19 VITALS
SYSTOLIC BLOOD PRESSURE: 130 MMHG | WEIGHT: 180 LBS | HEIGHT: 65 IN | BODY MASS INDEX: 29.99 KG/M2 | DIASTOLIC BLOOD PRESSURE: 80 MMHG

## 2023-01-19 DIAGNOSIS — Z82.0 FAMILY HISTORY OF EPILEPSY AND OTHER DISEASES OF THE NERVOUS SYSTEM: ICD-10-CM

## 2023-01-19 DIAGNOSIS — G44.89 OTHER HEADACHE SYNDROME: ICD-10-CM

## 2023-01-19 PROCEDURE — 99204 OFFICE O/P NEW MOD 45 MIN: CPT

## 2023-01-19 RX ORDER — POLYETHYLENE GLYCOL-3350 AND ELECTROLYTES WITH FLAVOR PACK 240; 5.84; 2.98; 6.72; 22.72 G/278.26G; G/278.26G; G/278.26G; G/278.26G; G/278.26G
240 POWDER, FOR SOLUTION ORAL
Qty: 4000 | Refills: 0 | Status: COMPLETED | COMMUNITY
Start: 2022-07-13 | End: 2023-01-19

## 2023-01-19 RX ORDER — MELOXICAM 15 MG/1
15 TABLET ORAL
Qty: 14 | Refills: 0 | Status: COMPLETED | COMMUNITY
Start: 2022-05-10 | End: 2023-01-19

## 2023-01-19 RX ORDER — IBUPROFEN 600 MG/1
600 TABLET ORAL
Qty: 16 | Refills: 0 | Status: COMPLETED | COMMUNITY
Start: 2022-03-29 | End: 2023-01-19

## 2023-01-19 RX ORDER — CEPHALEXIN 500 MG/1
500 CAPSULE ORAL
Qty: 14 | Refills: 0 | Status: COMPLETED | COMMUNITY
Start: 2022-07-26 | End: 2023-01-19

## 2023-01-19 RX ORDER — POLYETHYLENE GLYCOL 3350 AND ELECTROLYTES WITH LEMON FLAVOR 236; 22.74; 6.74; 5.86; 2.97 G/4L; G/4L; G/4L; G/4L; G/4L
236 POWDER, FOR SOLUTION ORAL
Qty: 1 | Refills: 0 | Status: COMPLETED | COMMUNITY
Start: 2022-07-13 | End: 2023-01-19

## 2023-01-19 RX ORDER — HYDROCHLOROTHIAZIDE 12.5 MG/1
12.5 TABLET ORAL
Qty: 30 | Refills: 0 | Status: COMPLETED | COMMUNITY
Start: 2022-05-27 | End: 2023-01-19

## 2023-01-19 RX ORDER — LOSARTAN POTASSIUM 25 MG/1
25 TABLET, FILM COATED ORAL
Refills: 0 | Status: COMPLETED | COMMUNITY
End: 2023-01-19

## 2023-01-19 RX ORDER — UBROGEPANT 100 MG/1
100 TABLET ORAL
Qty: 14 | Refills: 0 | Status: COMPLETED | COMMUNITY
Start: 2022-09-21 | End: 2023-01-19

## 2023-01-19 RX ORDER — AMOXICILLIN 500 MG/1
500 CAPSULE ORAL
Qty: 21 | Refills: 0 | Status: COMPLETED | COMMUNITY
Start: 2022-03-29 | End: 2023-01-19

## 2023-01-19 RX ORDER — FAMOTIDINE 40 MG/1
40 TABLET, FILM COATED ORAL
Qty: 30 | Refills: 0 | Status: COMPLETED | COMMUNITY
Start: 2022-09-21 | End: 2023-01-19

## 2023-01-19 RX ORDER — SPIRONOLACTONE 50 MG/1
50 TABLET ORAL
Qty: 30 | Refills: 0 | Status: COMPLETED | COMMUNITY
Start: 2022-05-10 | End: 2023-01-19

## 2023-01-19 RX ORDER — VALACYCLOVIR 1 G/1
1 TABLET, FILM COATED ORAL
Qty: 30 | Refills: 0 | Status: COMPLETED | COMMUNITY
Start: 2022-06-01 | End: 2023-01-19

## 2023-01-19 RX ORDER — CLOTRIMAZOLE 10 MG/G
1 CREAM TOPICAL
Qty: 15 | Refills: 0 | Status: COMPLETED | COMMUNITY
Start: 2022-07-04 | End: 2023-01-19

## 2023-01-19 RX ORDER — TERBINAFINE HYDROCHLORIDE 250 MG/1
250 TABLET ORAL
Qty: 30 | Refills: 0 | Status: COMPLETED | COMMUNITY
Start: 2022-06-21 | End: 2023-01-19

## 2023-01-19 RX ORDER — PANTOPRAZOLE 40 MG/1
40 TABLET, DELAYED RELEASE ORAL
Refills: 0 | Status: COMPLETED | COMMUNITY
End: 2023-01-19

## 2023-01-19 RX ORDER — HYDROCHLOROTHIAZIDE 12.5 MG/1
12.5 CAPSULE ORAL
Qty: 30 | Refills: 0 | Status: COMPLETED | COMMUNITY
Start: 2022-07-04 | End: 2023-01-19

## 2023-01-19 NOTE — ASSESSMENT
[FreeTextEntry1] : This is a 47-year-old woman with a long history of headache.\par The description is suggestive of migraine that has evolved into a chronic daily pattern.\par Imaging has been negative.\par \par I have explained that there are essentially 2 strategies for dealing with chronic headaches.  The first is abortive medication of which there are numerous over-the-counter preparations as well as prescription options.  The second strategy is prophylactic medications.  I have explained that these are medications which prevent headaches when taken on a regular basis.  I have explained that there are several medications which have been found to be preventative against headaches.  I have further explained that none of the medications have an immediate effect.  They must build up in the system over a few weeks.  Most people notice that within a few weeks on the medication, the headache intensity is diminishing.  Within a few more weeks most people begin to notice that the frequency is decreasing as well.  I have explained that the preventive medications were all originally used for other conditions but were also found to work against chronic headaches.  The patient seemed to understand my explanation.\par \par Ubrelvy has been working for her to abort the headaches.\par They continue to come frequently.\par \par I have suggested a trial of Topamax 25 mg to be taken at bedtime in an attempt to decrease the frequency and intensity of the headaches.\par \par I have discussed the potential side effects of the medication with the patient and have advised the patient to call me should any new symptoms occur.\par \par I will see her back in 4 months.
primaparous mom

## 2023-01-19 NOTE — HISTORY OF PRESENT ILLNESS
[FreeTextEntry1] : I saw this patient in the office today.\par \par The patient describes headaches.\par This has been going on for many years.\par It has become daily.\par It waxes and wanes in intensity.\par At least 3 times per week it is quite severe.\par When severe it is associated with dizziness and photophobia.\par \par She reports that she was given Ubrelvy to use as needed.\par This has been working fairly well.\par The severe headaches continued to come at least 3 times per week.

## 2023-01-19 NOTE — CONSULT LETTER
[Dear  ___] : Dear  [unfilled], [Courtesy Letter:] : I had the pleasure of seeing your patient, [unfilled], in my office today. [Please see my note below.] : Please see my note below. [Consult Closing:] : Thank you very much for allowing me to participate in the care of this patient.  If you have any questions, please do not hesitate to contact me. [Sincerely,] : Sincerely, [FreeTextEntry3] : Italo Serra MD.

## 2023-05-23 ENCOUNTER — APPOINTMENT (OUTPATIENT)
Dept: NEUROLOGY | Facility: CLINIC | Age: 48
End: 2023-05-23

## 2023-06-25 ENCOUNTER — EMERGENCY (EMERGENCY)
Facility: HOSPITAL | Age: 48
LOS: 1 days | Discharge: HOME | End: 2023-06-25
Attending: EMERGENCY MEDICINE
Payer: COMMERCIAL

## 2023-06-25 VITALS
TEMPERATURE: 98 F | OXYGEN SATURATION: 99 % | HEART RATE: 80 BPM | DIASTOLIC BLOOD PRESSURE: 85 MMHG | RESPIRATION RATE: 18 BRPM | HEIGHT: 65 IN | SYSTOLIC BLOOD PRESSURE: 130 MMHG | WEIGHT: 192.02 LBS

## 2023-06-25 PROCEDURE — 99284 EMERGENCY DEPT VISIT MOD MDM: CPT

## 2023-06-25 RX ORDER — IBUPROFEN 200 MG
600 TABLET ORAL ONCE
Refills: 0 | Status: COMPLETED | OUTPATIENT
Start: 2023-06-25 | End: 2023-06-25

## 2023-06-25 RX ADMIN — Medication 600 MILLIGRAM(S): at 23:28

## 2023-06-25 NOTE — ED PROVIDER NOTE - CLINICAL SUMMARY MEDICAL DECISION MAKING FREE TEXT BOX
49 y/o F hx HTN presents c/o right 4th/5th toe pain after stubbing it this morning 49 y/o F hx HTN presents c/o right 4th/5th toe pain after stubbing it this morning  xray with fracture 5th toe proximal phlalanx  francisco tape, f/u podiatry    motrin/tylenol prn

## 2023-06-25 NOTE — ED PROVIDER NOTE - OBJECTIVE STATEMENT
49 y/o F hx HTN presents c/o right 4th/5th toe pain after stubbing toe and tripping this morning. pt took tylenol earlier but pain persists. no other injuries/complaint at this time.    Iván

## 2023-06-25 NOTE — ED PROVIDER NOTE - CARE PROVIDER_API CALL
Ranjit Fuchs  Podiatric Medicine and Surgery  [address] [place] [address]  Phone: [phone]  Fax: [phone]  Follow Up Time:

## 2023-06-25 NOTE — ED ADULT TRIAGE NOTE - SPO2 (%)
Patient comes to the clinic for a follow up anticoagulation visit.   Last INR on 7/10/17 was 2.5.  Dose maintained per protocol.   Today's INR is 2.2 and is within goal range.    Current warfarin dosing verified with patient.   Patient was informed that their INR result was within therapeutic range and instructed to maintain current dose per protocol.  Please see patient findings.    Patient was instructed to contact the [facility] with any unusual bleeding or bruising, any changes in medications or over the counter medications, start of antibiotics, changes in diet or health status, any acute illnesses, and if there are any other questions or concerns. Patient verbalized understanding of above.  Dr. Velasco is in the office today supervising the treatment. Note forwarded to physician for review.   99

## 2023-06-25 NOTE — ED PROVIDER NOTE - NS ED ATTENDING STATEMENT MOD
I have seen and examined this patient and fully participated in the care of this patient as the teaching attending.  The service was shared with the JERONIMO.  I reviewed and verified the documentation and independently performed the documented:

## 2023-06-25 NOTE — ED PROVIDER NOTE - PATIENT PORTAL LINK FT
You can access the FollowMyHealth Patient Portal offered by [facility] by registering at the following website: [email or web address]. By joining [employer]’s FollowMyHealth portal, you will also be able to view your health information using other applications (apps) compatible with our system.

## 2023-06-25 NOTE — ED PROVIDER NOTE - ATTENDING CONTRIBUTION TO CARE
Joie S[age]HQA-79-nafz-old female presents with right toe pain fourth and fifth toe after she banged her toe at home.  Patient said that she has pain when she walks no other injuries patient denies pregnancy    Patient is alert well-appearing female, S1-S2 is normal regular, bilateral clear breath sounds, no acute distress, neuro exam alert oriented x3 no focal deficits, skin warm dry good turgor, right flank focal tenderness at the base of the fifth toe no deformity no open wounds    Plan to do x-ray of the foot to rule out any fracture likely contusion we will treat for pain and discharge

## 2023-06-25 NOTE — ED PROVIDER NOTE - PHYSICAL EXAMINATION
Gen: No acute distress, non toxic  HEENT: Mucous membranes moist, pink conjunctivae, EOMI  Neuro: A&O x 3, moving all 4 extremities  MSK: +tenderness along right 4th toe, mild tenderness 5th toe, 2+ PT/DP pulses, no erythema or abrasions, sensation intact  Skin: No rashes. intact and perfused.

## 2023-06-25 NOTE — ED ADULT NURSE NOTE - NSFALLRISKINTERV_ED_ALL_ED

## 2023-06-25 NOTE — ED PROVIDER NOTE - NSFOLLOWUPINSTRUCTIONS_ED_ALL_ED_FT
[place] ibuprofeno 600 mg cada 6 horas según sea necesario para el dolor. [place] Tylenol 650 mg cada 6 horas según sea necesario para el dolor. Seguimiento con podología. Regrese a la hernán de emergencias si hay síntomas nuevos o que empeoran.

## 2023-06-25 NOTE — ED ADULT NURSE NOTE - OBJECTIVE STATEMENT
pt is a 47 y/o/f w/pmhx HTN presents w/R 4th/5th toe pain after stubbing her toe this am. took tylenol w/o relief. denies other complaints

## 2023-06-26 PROCEDURE — 73630 X-RAY EXAM OF FOOT: CPT | Mod: 26,RT

## 2023-06-26 PROCEDURE — 73630 X-RAY EXAM OF FOOT: CPT

## 2023-06-26 PROCEDURE — T1013: CPT

## 2023-06-26 PROCEDURE — 99283 EMERGENCY DEPT VISIT LOW MDM: CPT

## 2023-09-18 ENCOUNTER — EMERGENCY (EMERGENCY)
Facility: HOSPITAL | Age: 48
LOS: 1 days | Discharge: HOME | End: 2023-09-18
Attending: EMERGENCY MEDICINE
Payer: COMMERCIAL

## 2023-09-18 VITALS
RESPIRATION RATE: 16 BRPM | OXYGEN SATURATION: 99 % | TEMPERATURE: 98 F | SYSTOLIC BLOOD PRESSURE: 116 MMHG | HEART RATE: 70 BPM | DIASTOLIC BLOOD PRESSURE: 86 MMHG | WEIGHT: 180.12 LBS

## 2023-09-18 LAB
ALBUMIN SERPL ELPH-MCNC: 4 G/DL — SIGNIFICANT CHANGE UP (ref 3.3–5.2)
ALP SERPL-CCNC: 80 U/L — SIGNIFICANT CHANGE UP (ref 40–120)
ALT FLD-CCNC: 35 U/L — HIGH
ANION GAP SERPL CALC-SCNC: 13 MMOL/L — SIGNIFICANT CHANGE UP (ref 5–17)
AST SERPL-CCNC: 51 U/L — HIGH
BASOPHILS # BLD AUTO: 0.05 K/UL — SIGNIFICANT CHANGE UP (ref 0–0.2)
BASOPHILS NFR BLD AUTO: 0.5 % — SIGNIFICANT CHANGE UP (ref 0–2)
BILIRUB SERPL-MCNC: 0.2 MG/DL — LOW (ref 0.4–2)
BUN SERPL-MCNC: 23.5 MG/DL — HIGH (ref 8–20)
CALCIUM SERPL-MCNC: 9.4 MG/DL — SIGNIFICANT CHANGE UP (ref 8.4–10.5)
CHLORIDE SERPL-SCNC: 100 MMOL/L — SIGNIFICANT CHANGE UP (ref 96–108)
CO2 SERPL-SCNC: 26 MMOL/L — SIGNIFICANT CHANGE UP (ref 22–29)
CREAT SERPL-MCNC: 0.88 MG/DL — SIGNIFICANT CHANGE UP (ref 0.5–1.3)
EGFR: 82 ML/MIN/1.73M2 — SIGNIFICANT CHANGE UP
EOSINOPHIL # BLD AUTO: 0.2 K/UL — SIGNIFICANT CHANGE UP (ref 0–0.5)
EOSINOPHIL NFR BLD AUTO: 2 % — SIGNIFICANT CHANGE UP (ref 0–6)
GLUCOSE SERPL-MCNC: 78 MG/DL — SIGNIFICANT CHANGE UP (ref 70–99)
HCT VFR BLD CALC: 41.7 % — SIGNIFICANT CHANGE UP (ref 34.5–45)
HGB BLD-MCNC: 13.9 G/DL — SIGNIFICANT CHANGE UP (ref 11.5–15.5)
IMM GRANULOCYTES NFR BLD AUTO: 0.4 % — SIGNIFICANT CHANGE UP (ref 0–0.9)
LYMPHOCYTES # BLD AUTO: 4.14 K/UL — HIGH (ref 1–3.3)
LYMPHOCYTES # BLD AUTO: 40.8 % — SIGNIFICANT CHANGE UP (ref 13–44)
MCHC RBC-ENTMCNC: 29.5 PG — SIGNIFICANT CHANGE UP (ref 27–34)
MCHC RBC-ENTMCNC: 33.3 GM/DL — SIGNIFICANT CHANGE UP (ref 32–36)
MCV RBC AUTO: 88.5 FL — SIGNIFICANT CHANGE UP (ref 80–100)
MONOCYTES # BLD AUTO: 0.61 K/UL — SIGNIFICANT CHANGE UP (ref 0–0.9)
MONOCYTES NFR BLD AUTO: 6 % — SIGNIFICANT CHANGE UP (ref 2–14)
NEUTROPHILS # BLD AUTO: 5.11 K/UL — SIGNIFICANT CHANGE UP (ref 1.8–7.4)
NEUTROPHILS NFR BLD AUTO: 50.3 % — SIGNIFICANT CHANGE UP (ref 43–77)
PLATELET # BLD AUTO: 289 K/UL — SIGNIFICANT CHANGE UP (ref 150–400)
POTASSIUM SERPL-MCNC: 5.4 MMOL/L — HIGH (ref 3.5–5.3)
POTASSIUM SERPL-SCNC: 5.4 MMOL/L — HIGH (ref 3.5–5.3)
PROT SERPL-MCNC: 7.5 G/DL — SIGNIFICANT CHANGE UP (ref 6.6–8.7)
RBC # BLD: 4.71 M/UL — SIGNIFICANT CHANGE UP (ref 3.8–5.2)
RBC # FLD: 14.8 % — HIGH (ref 10.3–14.5)
SODIUM SERPL-SCNC: 139 MMOL/L — SIGNIFICANT CHANGE UP (ref 135–145)
TROPONIN T SERPL-MCNC: <0.01 NG/ML — SIGNIFICANT CHANGE UP (ref 0–0.06)
WBC # BLD: 10.15 K/UL — SIGNIFICANT CHANGE UP (ref 3.8–10.5)
WBC # FLD AUTO: 10.15 K/UL — SIGNIFICANT CHANGE UP (ref 3.8–10.5)

## 2023-09-18 PROCEDURE — 99285 EMERGENCY DEPT VISIT HI MDM: CPT

## 2023-09-18 PROCEDURE — 80053 COMPREHEN METABOLIC PANEL: CPT

## 2023-09-18 PROCEDURE — 93005 ELECTROCARDIOGRAM TRACING: CPT

## 2023-09-18 PROCEDURE — 93010 ELECTROCARDIOGRAM REPORT: CPT | Mod: 77

## 2023-09-18 PROCEDURE — 84484 ASSAY OF TROPONIN QUANT: CPT

## 2023-09-18 PROCEDURE — 71045 X-RAY EXAM CHEST 1 VIEW: CPT | Mod: 26

## 2023-09-18 PROCEDURE — 93010 ELECTROCARDIOGRAM REPORT: CPT

## 2023-09-18 PROCEDURE — 36415 COLL VENOUS BLD VENIPUNCTURE: CPT

## 2023-09-18 PROCEDURE — 85025 COMPLETE CBC W/AUTO DIFF WBC: CPT

## 2023-09-18 PROCEDURE — T1013: CPT

## 2023-09-18 PROCEDURE — 71045 X-RAY EXAM CHEST 1 VIEW: CPT

## 2023-09-18 PROCEDURE — 99285 EMERGENCY DEPT VISIT HI MDM: CPT | Mod: 25

## 2023-09-18 RX ORDER — SODIUM CHLORIDE 9 MG/ML
1000 INJECTION, SOLUTION INTRAVENOUS ONCE
Refills: 0 | Status: COMPLETED | OUTPATIENT
Start: 2023-09-18 | End: 2023-09-18

## 2023-09-18 RX ORDER — ACETAMINOPHEN 500MG 500 MG/1
2 TABLET, COATED ORAL
Qty: 30 | Refills: 0 | DISCHARGE
Start: 2023-09-18 | End: 2023-09-22

## 2023-09-18 RX ORDER — ACETAMINOPHEN 500 MG
2 TABLET ORAL
Qty: 30 | Refills: 0
Start: 2023-09-18 | End: 2023-09-22

## 2023-09-18 RX ORDER — AMOXICILLIN 250 MG/5ML
2 SUSPENSION, RECONSTITUTED, ORAL (ML) ORAL
Qty: 28 | Refills: 0
Start: 2023-09-18 | End: 2023-09-24

## 2023-09-18 RX ADMIN — SODIUM CHLORIDE 1000 MILLILITER(S): 9 INJECTION, SOLUTION INTRAVENOUS at 21:46

## 2023-09-18 NOTE — ED PROVIDER NOTE - PATIENT PORTAL LINK FT
You can access the FollowMyHealth Patient Portal offered by [facility] by registering at the following website: [email or web address]. By joining [employer]’s FollowMyHealth portal, you will also be able to view your health information using other applications (apps) compatible with our system.

## 2023-09-18 NOTE — ED PROVIDER NOTE - PHYSICAL EXAMINATION
Const: Pt is well appearing. Awake, alert and oriented to person, place, & time. In no acute distress.   HEENT: NC/AT. Right TM erythema   Eyes: PERRLA. No scleral icterus. EOMI.  Neck:. Soft and supple. Full ROM without pain. No cervical midline tenderness.   Cardiac: Regular rate and regular rhythm. +S1/S2. Peripheral pulses 2+ and symmetric. No LE edema.  Resp: Speaking in full sentences, breath sounds equal and clear bilaterally. No wheezes, rales or rhonchi.  Abd: Soft, non-tender, non-distended. Normal bowel sounds in all 4 quadrants. No guarding or rebound.  MSK: Spine midline and non-tender. No CVAT.  Skin: No rashes, abrasions or lacerations.  Neuro: Moves all extremities symmetrically. No motor or sensory deficits

## 2023-09-18 NOTE — ED PROVIDER NOTE - ADDITIONAL NOTES AND INSTRUCTIONS:
PT was evaluated At [facility] ED and was found to have a condition that warranted time of to rest and heal from WORK/SCHOOL.     Dr. William Wiedemann, DO

## 2023-09-18 NOTE — ED PROVIDER NOTE - CLINICAL SUMMARY MEDICAL DECISION MAKING FREE TEXT BOX
48-year-old female patient presents ED complaining chest pain.  Will check EKG, labs, give fluids, reevaluate 48-year-old female patient presents ED complaining chest pain.  Will check EKG, labs, give fluids, reevaluate    patient feeling better after fluids.  EKG shows no signs of ischemia, unchanged from previous EKGs.  Chest x-ray normal with no consolidations.  Negative troponin, no anemia.  Right TM does have some mild erythema, will treat otitis media with 7 days of p.o. amoxicillin.  Patient instructed to return to the ED for worsening chest pain, shortness of breath, abdominal pain, nausea vomiting diarrhea.  Patient instructed continue taking her home medication and follow-up with her cardiologist within 1 to 2 days.  Patient understands plan of care

## 2023-09-18 NOTE — ED ADULT NURSE NOTE - NSFALLUNIVINTERV_ED_ALL_ED
Bed/Stretcher in lowest position, wheels locked, appropriate side rails in place/Call bell, personal items and telephone in reach/Instruct patient to call for assistance before getting out of bed/chair/stretcher/Non-slip footwear applied when patient is off stretcher/[place] to call system/Physically safe environment - no spills, clutter or unnecessary equipment/Purposeful proactive rounding/Room/bathroom lighting operational, light cord in reach

## 2023-09-18 NOTE — ED PROVIDER NOTE - OBJECTIVE STATEMENT
48-year-old female patient with a history of CAD, on aspirin, hypertension, presents to the ED complaining of chest pressure/pain.  Patient states over the past few days she has had dizziness and headache, and chills.  Patient states that today she was at physical therapy when she felt like her blood pressure was low and had bilateral chest pressure.  Patient was seen in urgent care and sent to the ED for evaluation.  In the ED she denies any cough or shortness of breath, radiation of pain, abdominal pain, nausea vomiting diarrhea.  Patient has been eating and drinking well.  No further complaints at this time

## 2023-09-18 NOTE — ED ADULT TRIAGE NOTE - CHIEF COMPLAINT QUOTE
Pt. c/o weakness, chest pain/pressure, right ear pain, HA 9/10 sudden onset 2 hrs ago while at PT.  Pt. also c/o hypotention.  Seen at urgent care, sent here for further eval.

## 2023-09-18 NOTE — ED ADULT NURSE NOTE - OBJECTIVE STATEMENT
Pt c/o of chest pressure, left ear pain, and generalized weakness that started today while at PT. Denies SOB or pain radiaiton

## 2023-09-18 NOTE — ED PROVIDER NOTE - NSFOLLOWUPINSTRUCTIONS_ED_ALL_ED_FT
Dolor de pecho inespecífico      El dolor de pecho puede deberse a muchas afecciones diferentes. Siempre existe blake posibilidad de que el dolor esté relacionado con algo grave, chance un infarto de miocardio o un coágulo sanguíneo en los pulmones. Hay diferentes afecciones que no son potencialmente mortales que pueden causar dolor de pecho. Si tiene dolor de pecho, es muy importante que se controle con el médico.     ¿Cuáles son las causas?  Entre las causas de esta afección se incluyen las siguientes:    Acidez estomacal.  Neumonía o bronquitis.  Ansiedad o estrés.  Inflamación de la milana que rodea el corazón (pericarditis) o los pulmones (pleuritis o pleuresía).  Un coágulo sanguíneo en el pulmón.  Pulmón colapsado (neumotórax). Puede aparecer de manera repentina por sí solo (neumotórax espontáneo) o debido a un traumatismo en el tórax.  Culebrilla (virus de la varicela zóster).  Infarto de miocardio.  Daño de los huesos, los músculos y los cartílagos que conforman la pared torácica. [place] puede incluir lo siguiente:  Hematomas óseos debido a lesiones.  Distensiones musculares o de los cartílagos por tos frecuente o repetida, o por exceso de trabajo.  Fractura de blake o más costillas.  Dolor de cartílago debido a inflamación (costocondritis).    ¿Qué incrementa el riesgo?  Entre los factores de riesgo de esta afección se pueden incluir los siguientes:    Actividades que incrementan el riesgo de sufrir traumatismos o lesiones en el tórax.  Infecciones o enfermedades respiratorias que causan tos frecuente.  Afecciones o excesos en las comidas que pueden causar acidez estomacal.  Cardiopatías coronarias o antecedentes familiares de enfermedades cardíacas.  Afecciones o comportamientos de sajan que aumentan el riesgo de tener un coágulo sanguíneo.  Deion tenido varicela (varicela zóster).    ¿Cuáles son los signos o los síntomas?  El dolor de pecho puede provocar las siguientes sensaciones:    Ardor u hormigueo en la superficie o en lo profundo del pecho.  Dolor opresivo, continuo o constrictivo.  Dolor vago o intenso que empeora al moverse, toser o inhalar profundamente.  Dolor que también se siente en la espalda, el johnathan, el hombro o el brazo, o dolor que se extiende a cualquiera de estas zonas.    El dolor de pecho puede aparecer y desaparecer, o maría puede ser yaima.    ¿Cómo se diagnostica?  Quizás se necesiten análisis de laboratorio u otros estudios para encontrar la causa del dolor. El médico puede indicarle que se stoney blake prueba llamada ECG (electrocardiograma). El electrocardiograma registra los patrones de los latidos cardíacos en el momento en que se realiza el estudio. También pueden hacerle otros estudios, por ejemplo:    Ecocardiograma transtorácico (ETT). En magda estudio, se usan ondas sonoras para crear blake imagen de las estructuras cardíacas y evaluar cómo circula la aruna por el corazón.  Ecocardiografía transesofágica (ETE). Magda es un estudio de diagnóstico por imágenes más avanzado mediante el cual se carlos imágenes del interior del cuerpo. Le permite al médico jasmyn el corazón con mayor detalle.  Monitoreo cardíaco. Permite que el médico controle la frecuencia y el ritmo cardíacos en tiempo real.  Monitor Holter. Es un dispositivo portátil que registra los latidos del corazón y puede ayudar a diagnosticar los latidos cardíacos anómalos. Le permite al médico registrar la actividad cardíaca paty varios días, si es necesario.  Pruebas de esfuerzo. Estas pueden realizarse paty el ejercicio o mediante la administración de un medicamento que acelera los latidos del corazón.  Análisis de aruna.  Otros estudios de diagnóstico por imágenes.    ¿Cómo se trata?  El tratamiento depende de la causa del dolor de pecho. El tratamiento puede incluir lo siguiente:    Medicamentos. Estos pueden incluir, entre otros, los siguientes:  Inhibidores de la acidez estomacal.  Antiinflamatorios.  Analgésicos para las enfermedades inflamatorias.  Antibióticos, si hay blake infección.  Medicamentos para disolver los coágulos sanguíneos.  Medicamentos para tratar la enfermedad arterial coronaria (EAC).  Tratamiento complementario para las enfermedades que no requieren la srinath de medicamentos. Algunas opciones de magda tipo de tratamiento incluyen las siguientes:  Hacer reposo.  Aplicar compresas frías o calientes en las zonas lesionadas.  Limitar las actividades hasta que disminuya el dolor.    Siga estas indicaciones en crowe casa:  Medicamentos    Si le recetaron un antibiótico, tómelo chance se lo haya indicado el médico. No deje de alan el antibiótico aunque comience a sentirse mejor.  Tijeras los medicamentos de venta carlos y los recetados solamente chance se lo haya indicado el médico.    Estilo de sheree    No consuma ningún producto que contenga nicotina o tabaco, chance cigarrillos y cigarrillos electrónicos. Si necesita ayuda para dejar de fumar, consulte al médico.  No obed alcohol.  Stoney cambios en crowe estilo de sheree chance se lo haya indicado el médico. Estos pueden incluir, entre otros, los siguientes:   Practicar actividad física con regularidad. Pida al médico que le sugiera algunas actividades que juan daniel seguras para usted.  Consumir blake dieta cardiosaludable. Un nutricionista matriculado puede ayudarlo a hacer elecciones saludables.  Mantener un peso saludable.  Controlar la diabetes, si es necesario.  Disminuir el nivel de estrés; por ejemplo, con yoga o técnicas de relajación.    Instrucciones generales    Evite las actividades que le causen dolor de pecho.  Si la causa del dolor de pecho es la acidez estomacal, levante (eleve) la cabecera de la cama aproximadamente 6 pulgadas (15 cm) colocando bloques debajo de las patas. Usar más almohadas para dormir no es efectivo para aliviar la acidez estomacal porque solo modificaría la posición de la tim.  Concurra a todas las visitas de seguimiento chance se lo haya indicado el médico. [place] es importante. [place] incluye otros estudios si el dolor de pecho no desaparece.    Comuníquese con un médico si:  El dolor de pecho no desaparece.  Tiene blake erupción cutánea con ampollas en el pecho.  Tiene fiebre.  Tiene escalofríos.    Solicite ayuda de inmediato si:  El dolor en el pecho es más intenso.  Tiene tos que empeora o tose con aruna.  Siente un dolor intenso en el abdomen.  Siente debilidad intensa.  Se desmaya.  Tiene blake molestia repentina e inexplicable en el pecho.  Tiene molestias repentinas e inexplicables en los brazos, la espalda, el johnathan o la mandíbula.  Le falta el aire en cualquier momento.  Comienza a sudar de manera repentina o la piel se le humedece.  Siente náuseas o vomita.  Se siente repentinamente mareado o se desmaya.  Siente que el corazón comienza a latir rápidamente o que se saltea latidos.    Estos síntomas pueden representar un problema grave que constituye blake emergencia. No espere hasta que los síntomas desaparezcan. Solicite atención médica de inmediato. Comuníquese con el servicio de emergencias de crowe localidad (911 en los [place]). No conduzca por joyce propios medios hasta el hospital.    NOTAS ADICIONALES E INSTRUCCIONES    Por favor tenga seguimiento con crowe doctor primario entre 2 hughes.  Regrese a urgencias por cualquier preocupacion medica.

## 2023-11-14 ENCOUNTER — OFFICE (OUTPATIENT)
Dept: URBAN - METROPOLITAN AREA CLINIC 63 | Facility: CLINIC | Age: 48
Setting detail: OPHTHALMOLOGY
End: 2023-11-14
Payer: COMMERCIAL

## 2023-11-14 DIAGNOSIS — H35.033: ICD-10-CM

## 2023-11-14 DIAGNOSIS — H52.4: ICD-10-CM

## 2023-11-14 DIAGNOSIS — H25.13: ICD-10-CM

## 2023-11-14 DIAGNOSIS — H04.123: ICD-10-CM

## 2023-11-14 DIAGNOSIS — H40.013: ICD-10-CM

## 2023-11-14 PROCEDURE — 92015 DETERMINE REFRACTIVE STATE: CPT | Performed by: STUDENT IN AN ORGANIZED HEALTH CARE EDUCATION/TRAINING PROGRAM

## 2023-11-14 PROCEDURE — 92133 CPTRZD OPH DX IMG PST SGM ON: CPT | Performed by: STUDENT IN AN ORGANIZED HEALTH CARE EDUCATION/TRAINING PROGRAM

## 2023-11-14 PROCEDURE — 92014 COMPRE OPH EXAM EST PT 1/>: CPT | Performed by: STUDENT IN AN ORGANIZED HEALTH CARE EDUCATION/TRAINING PROGRAM

## 2023-11-14 ASSESSMENT — REFRACTION_AUTOREFRACTION
OS_SPHERE: +0.50
OS_CYLINDER: -0.50
OS_AXIS: 093
OD_AXIS: 123
OD_SPHERE: +0.50
OD_CYLINDER: -0.50

## 2023-11-14 ASSESSMENT — CONFRONTATIONAL VISUAL FIELD TEST (CVF)
OD_FINDINGS: FULL
OS_FINDINGS: FULL

## 2023-11-14 ASSESSMENT — REFRACTION_MANIFEST
OS_ADD: +1.75
OS_SPHERE: +0.50
OD_VA1: 20/20
OD_SPHERE: +0.50
OS_VA1: 20/20
OD_ADD: +1.75
OD_AXIS: 125
OS_AXIS: 095
OD_CYLINDER: -0.50
OS_CYLINDER: -0.50

## 2023-11-14 ASSESSMENT — REFRACTION_CURRENTRX
OS_VPRISM_DIRECTION: SV
OD_CYLINDER: -0.25
OS_CYLINDER: -0.75
OD_VPRISM_DIRECTION: SV
OD_OVR_VA: 20/
OD_SPHERE: PLANO
OD_AXIS: 131
OS_ADD: +1.75
OS_SPHERE: +0.50
OS_OVR_VA: 20/
OD_ADD: +1.75
OS_AXIS: 088

## 2023-11-14 ASSESSMENT — SPHEQUIV_DERIVED
OD_SPHEQUIV: 0.25
OS_SPHEQUIV: 0.25
OD_SPHEQUIV: 0.25
OS_SPHEQUIV: 0.25

## 2023-11-14 ASSESSMENT — TEAR BREAK UP TIME (TBUT)
OD_TBUT: T
OS_TBUT: T

## 2023-12-05 ENCOUNTER — APPOINTMENT (OUTPATIENT)
Dept: NEUROLOGY | Facility: CLINIC | Age: 48
End: 2023-12-05
Payer: MEDICAID

## 2023-12-05 VITALS
HEIGHT: 65 IN | WEIGHT: 188 LBS | SYSTOLIC BLOOD PRESSURE: 110 MMHG | DIASTOLIC BLOOD PRESSURE: 70 MMHG | BODY MASS INDEX: 31.32 KG/M2

## 2023-12-05 PROCEDURE — 99213 OFFICE O/P EST LOW 20 MIN: CPT

## 2023-12-05 RX ORDER — UBROGEPANT 100 MG/1
100 TABLET ORAL
Refills: 0 | Status: COMPLETED | COMMUNITY
End: 2023-12-05

## 2024-03-02 NOTE — REASON FOR VISIT
MEDICARE WELLNESS VISIT + NOTE    CHIEF COMPLAINT:  Tonya presents for her Subsequent Annual Medicare Wellness Visit.   Her additional complaints or concerns are addressed below.      Patient Care Team:  Monica Billy MD as PCP - General  Trish Tony MD as Gastroenterologist (Gastroenterology)  Seth aLu MD as Internal Medicine- Interventional Cardiology (Internal Medicine- Interventional Cardiology)  Naeem Bay MD (Pulmonary Medicine)  Beni Myers MD (Internal Medicine)        Patient Active Problem List   Diagnosis    Acquired hypothyroidism    Hyperlipemia, mixed    CAD (coronary artery disease)    Essential hypertension with goal blood pressure less than 140/90    S/P CABG (coronary artery bypass graft)    Right 6Th nerve palsy    OSMANI on CPAP    Mild intermittent asthma without complication    Osteopenia    S/P ascending aortic aneurysm repair    S/P tricuspid valve repair    Psoriatic arthritis (CMD)    Class 1 obesity due to excess calories with serious comorbidity and body mass index (BMI) of 32.0 to 32.9 in adult    Double vision         Past Medical History:   Diagnosis Date    Abnormal stress test 01/29/2016    Anxiety     stress related to mother's dementia    Aortic aneurysm (CMD) 2015    was 4.4 cm    Blood clot associated with vein wall inflammation     leg age 19    Cerebral infarction (CMD)     MRI - 5/2022 - chronic microhemorrhages    Chronic pain     back pain    Closed trimalleolar fracture of ankle, right, initial encounter 01/07/2020    ?Orthopedist?;[facility]; plus ankle dislocation    Congenital heart disease in adult     vessel transposed, rebuilt and replaced by Dr. Senior    Coronary artery disease     COVID-19 virus infection 05/2022    Dizziness     Double vision 04/01/2022    Essential (primary) hypertension     Gastroesophageal reflux disease     Heart palpitations     Hyperlipidemia     Hyperplastic colon polyp 01/13/2010    Hyperplastic  colon polyp 07/08/2020    Dr Tony    Hypertensive kidney disease with stage 2 chronic kidney disease 01/13/2016    Hypothyroidism 1971    after initial hyperthyroidism in 1971 that was treated with radioactive iodine. Denies any biops or any thyroid surgery    Mild intermittent asthma, uncomplicated 12/30/2009    POS; Methacholine;sl decreased diffusion capacity; responds to BD    Osteoarthritis     DDD; DJD    Osteopenia 12/18/2009    T -1.1/ -1.4    PAST MEDICAL HISTORY per Dr. Swann NOT rheumatoid arthritis 2002    past Rheum, Dr. Myers ?Rheumatoid? vs erosive inflammatory OA followed by Dr. Myers;    Pituitary lesion (CMD) 05/2022    seen on MRI    Pneumonia     multiple episodes but states has never been hospitalized    Psoriatic arthritis (CMD) 2018    per [place], Dr. Kennedi Swann and not rheumatoid    Right 6Th nerve palsy 12/12/2013    Ophtho, Dr. George eval of diplopia    Severe obstructive sleep apnea 08/17/2015    CPAP; Dr. Bay    Tachycardia     Tricuspid insufficiency     mod to sereve regurg prior to 3/2017    Wears prescription eyeglasses          Past Surgical History:   Procedure Laterality Date    Ascending aortic aneurysm repair  03/16/2017    Dr. Keshav Ta    Breast mass excision Left 1980    Bx breast perc imaging  1979    left breast bx b9    Cardiac catherization  2016    Cardiac surgery  12/23/2005    1 bypass-> LIMA to LAD, Dr. Senior    Colonoscopy diagnostic  04/10/2015    Dr. Tony normal repeat in 5 years sec had 6 HP removed in 2010; and NO FH colon cancer    Colonoscopy diagnostic  07/08/2020    Dr Tony/ colonoscopy/ 5 Hyperplastic / Recall 5 years    Colonoscopy remove lesions by snare  01/13/2010    Dr Tony/HP x6/ recall 1/13/2015    Colonoscopy w biopsy  01/13/2010    Dr Tony    Coronary angiogram/possible ptca - cv  10/03/2022    Dexa bone density axial skeleton  05/23/2016    T -0.8 / -1.0 / -1.7    Fracture surgery Right 01/2020    ankle    Revision of  tricuspid valve  2017    Dr. Keshav Ta    Spinal cord stimulator implant  2018    Dr. Prem Wyatt; lumbar    Spinal cord stimulator removal Bilateral 2021    Complete removal of right buttock neurostimulator implanted pulse generator and electrodes. Dr. Chris Stovall    Stress test  2022    PET; abnormal    Vaginal delivery      x2         Social History     Tobacco Use    Smoking status: Former     Current packs/day: 0.00     Average packs/day: 0.5 packs/day for 42.0 years (21.0 ttl pk-yrs)     Types: Cigarettes     Start date: 1970     Quit date: 2012     Years since quittin.1     Passive exposure: Past    Smokeless tobacco: Never    Tobacco comments:     smoked 1/2 ppd; started age 20   Vaping Use    Vaping Use: never used   Substance Use Topics    Alcohol use: Not Currently     Alcohol/week: 0.0 standard drinks of alcohol    Drug use: No     Family History   Problem Relation Age of Onset    Diabetes Mother         Type 2    Hypertension Mother     Migraine Mother     Stroke Mother 88    Hyperlipidemia Mother     Peripheral Vascular Disease Mother         Stenosis of 2 arteries R renal artery and vertebral artery stenosis    Neurological Disorder Mother         Vascular dementia    Neuropathy Mother         sec diabetes    Coronary Artery Disease Mother         dxed age late 70s    Congestive Heart Failure Mother         dec sec this age 96    Other Mother         venous stasis; ulcers/skin break down; PMR    Osteoarthritis Mother         spine    Ophthalmology Mother         Fuchs disease s/p corneal transplant in one eye    Osteoarthritis Father         gout    Dementia/Alzheimers Father         vascular    Stroke/TIA Father         TIA x 2    Ophthalmology Father         double vision in his 80s needing prism glasses    Gastrointestinal Brother         mild colitis; NOT Crohns    Ophthalmology Brother         double vision in his 60/70's needing prism glasses    Congestive  Heart Failure Maternal Grandmother         dec age 48 sec this    Other Maternal Grandfather         dec age 94 sec old age    Dementia/Alzheimers Paternal Grandmother         dec age 86    Cancer Paternal Grandfather         ?type? dec sec this age 52    Patient is unaware of any medical problems Daughter     Colon Polyps Son 42        LARGE colon polyp    Sleep Apnea Son     Stroke Maternal Aunt 54    Macular degeneration Maternal Aunt     Stroke Maternal Uncle 48    Stroke Maternal Uncle 56         at 59 from complications from stroke    Cancer Maternal Uncle         multiple myeloma    NEGATIVE FAMILY HX OF Other         CA, mental illness, asthma, epilepsy, bleeding disorder, or kidney stones,     Cancer, Breast Neg Hx     Cancer, Colon Neg Hx          Current Outpatient Medications   Medication Sig Dispense Refill    losartan (COZAAR) 100 MG tablet Take 1 tablet by mouth at bedtime. 100 tablet 2    omeprazole (PriLOSEC) 40 MG capsule TAKE 1 CAPSULE BY MOUTH IN THE  MORNING 90 capsule 2    metoPROLOL succinate (TOPROL-XL) 50 MG 24 hr tablet TAKE 1 TABLET BY MOUTH IN THE  MORNING 90 tablet 2    DULoxetine (CYMBALTA) 60 MG capsule TAKE 1 CAPSULE BY MOUTH AT NIGHT 90 capsule 2    traZODone (DESYREL) 100 MG tablet TAKE 2 TABLETS BY MOUTH AT NIGHT 180 tablet 2    montelukast (SINGULAIR) 10 MG tablet TAKE 1 TABLET BY MOUTH IN THE  MORNING 90 tablet 2    pravastatin (PRAVACHOL) 40 MG tablet TAKE 1 TABLET BY MOUTH IN THE  MORNING 90 tablet 2    albuterol 108 (90 Base) MCG/ACT inhaler USE 2 INHALATIONS BY MOUTH  4 TIMES DAILY AS NEEDED FOR WHEEZING COUGH, OR  SHORTNESS OF BREATH 34 g 3    levothyroxine 150 MCG tablet Take 1 tablet by mouth daily (before breakfast). Needs to decrease dose to 150 mcg every day 90 tablet 2    budesonide-formoterol (SYMBICORT) 160-4.5 MCG/ACT inhaler Inhale 2 puffs into the lungs in the morning and 2 puffs in the evening. Keep on file til patient requests refill 10.2 g 3    furosemide  (LASIX) 40 MG tablet Take 1 tablet by mouth every other day. 45 tablet 3    Apremilast 30 MG Tab Take 1 tablet by mouth every 12 hours. Per Dr. Kennedi Swann      aspirin (ECOTRIN) 81 MG EC tablet Take 1 tablet by mouth daily (with breakfast). ACTUAL start date was Dec 23rd , 2005      Omega-3 Fatty Acids (Fish Oil) 1200 MG capsule Take 1,200 mg by mouth daily.      Multiple Vitamins-Minerals (Centrum) tablet Take 1 tablet by mouth daily (with breakfast).      amoxicillin (AMOXIL) 500 MG capsule Take 4 capsules one hour before dental procedure( heart valve titanium ring); Keep on file til patient requests refill 4 capsule 3    Cholecalciferol (VITAMIN D) 2000 UNIT tablet Take 2,000 Units by mouth daily.       No current facility-administered medications for this visit.        The following items on the Medicare Health Risk Assessment were found to be positive  3.) During the past 4 weeks, how would you rate your health?: Fair     5.) Do you do moderate to strenuous exercise (brisk walk) for about 20 minutes for 3 or more days per week?: No, I usually do not exercise this much     6 a.) How many servings of Fruits and Vegetables do you have each day ( 1 serving = 1 piece of fruit, 1/2 cup fruits or vegetables): 1 per day     7c.) Do you worry about falling?: Yes     8.) During the past 4 weeks, has your physical and emotional health limited your social activities with family, friends, neighbors, or other groups?: Moderately     11d.) Bodily pain: Often         Vision and Hearing screens: Not performed    Advance care planning documents on file - yes    Cognitive/Functional Status: no evidence of cognitive dysfunction by direct observation    Opioid Review: Tonya is not taking opioid medications.    Recent PHQ 2/9 Score:    PHQ 2:  PHQ 2 Score Adult PHQ 2 Score Adult PHQ 2 Interpretation Little interest or pleasure in activity?   2/21/2024   3:43 PM 0 No further screening needed 0       PHQ 9:       DEPRESSION  ASSESSMENT/PLAN:  Depression screening is negative no further plan needed.     Body mass index is 32.42 kg/m².    BMI ASSESSMENT/PLAN:  Patient is obese.    Might lose weight with aerobic exercise.       See Patient Instructions section.   Return in 3 months (on 5/28/2024) for recheck meds / HTN; and in about one year for Medicare Wellness Visit.      OUTPATIENT PROGRESS NOTE    Subjective   Chief Complaint Female  Problem, Medicare Wellness Visit, and Medicare Wellness Visit (Subsequent)    HPI   Is here today, because UTI frequent / urgent urination symptoms recurred 2 days after finished 7 days of Levaquin I prescribed in Feb 13th teleph encounter.  Also has suprapubic abdominal discomfort, and some recent fever, but denies dysuria, hematuria, nausea, or any recent diarrhea.  Also denies any vaginal irritation.    Today also reports has been treating for over one week with max dose 2x/day Mucinex, plus confirms every day maintenance Symbicort 2 inhalations 2x/day for cough productive of clear mucus with clear nasal dischage, and occasional wheezing yesterday with no shortness of breath, but has not added Albuterol.    Indicates has STILL NOT had updated Covid vaccine sec ill in Sept, then ill again NOV - mid December.     Medications  Medications were reviewed and updated today.    Histories  I have personally reviewed and updated the patient's past medical, past surgical, family and social histories during today's visit.    Review of Systems  -- see HPI    Objective   Visit Vitals  BP (!) 154/60 Comment: took sudafed,cold cough medications yesterday   Pulse 68   Temp 98.1 °F (36.7 °C) (Oral)   Ht 5' 3\" (1.6 m)   Wt 83 kg (183 lb)   SpO2 98%   BMI 32.42 kg/m²     Physical Exam  PE :  GENERAL : Obese, alert, pleasant woman, who sounds mildly hoarse, but does not appear ill, dyspneic, or in any obvious discomfort at this time.  VITAL SIGNS : were reviewed, including weight is up one lb from OCT.  HEENT  : TMs are  clear with no erythema or fluid behind drums, oropharynx has no erythema or exudate.  NECK : no cervical lymphadenopathy.  Carotids are 2+ and symmetric with no bruit.  LUNGS : clear to auscultation with no crackles or wheezes, and good air movement posteriorly, over right middle lobe, and anteriorly.  CARDIAC : Regular rate and rhythm, normal S1 and S2.  I do not appreciate any murmur or gallop.  ABDOMEN :  Nondistended, soft, normal bowel sounds, nontender, no masses or hepatosplenomegaly are appreciated.      Assessment & Plan   Diagnoses and associated orders for this visit:  1. Recurrent UTI (urinary tract infection)  -     Urine, Bacterial Culture  -     Urinalysis with microscopy without culture  -     Cefdinir  2. Viral URI with cough  3. Mild persistent asthma, uncomplicated  4. Need for COVID-19 vaccine  -     COVID MODERNA/SPIKEVAX 12+([id number])  5. Essential hypertension with goal blood pressure less than 140/90  6. Coronary artery disease involving native coronary artery of native heart without angina pectoris  7. Psoriatic arthritis (CMD)  8. Pituitary lesion (CMD)  9. Class 1 obesity due to excess calories with serious comorbidity and body mass index (BMI) of 32.0 to 32.9 in adult  10. S/P ascending aortic aneurysm repair     Recurrent UTI with negative Urine cultures in Sept and Oct -- will empirically treat with Cefdinir this time, and modify if indicated based on today's UCx.  See patient instructions and orders.   Possibly viral URI with cough, but does not appear ill or dyspneic, and Mild persistent uncomplicated asthma without current significant exacerbation.  Gave informed consent today for Covid vaccine, and recommended INCREASING treatment as detailed in patient instructions and orders.   Essential HTN with blood pressure goal of <140/90 -- has been in good control on current meds, and did not bring HOME BP record, so for now will continue current meds, and reassess at MAY appointment.    CAD, asymptomatic plus s/p ascending aortic aneurysm repair -- managed by cardiologist, Dr. Lau.   Psoriatic arthritis - managed by rheumatologist.   Pituitary lesion -- managed by [facility]       [Procedure: _________] : a [unfilled] procedure visit [FreeTextEntry1] : For dyspepsia and GERD and CRC screening

## 2024-04-17 ENCOUNTER — APPOINTMENT (OUTPATIENT)
Dept: OTOLARYNGOLOGY | Facility: CLINIC | Age: 49
End: 2024-04-17
Payer: COMMERCIAL

## 2024-04-17 VITALS — DIASTOLIC BLOOD PRESSURE: 79 MMHG | SYSTOLIC BLOOD PRESSURE: 114 MMHG | HEART RATE: 76 BPM

## 2024-04-17 DIAGNOSIS — M26.4 MALOCCLUSION, UNSPECIFIED: ICD-10-CM

## 2024-04-17 DIAGNOSIS — M26.609 UNSPECIFIED TEMPOROMANDIBULAR JOINT DISORDER: ICD-10-CM

## 2024-04-17 DIAGNOSIS — J34.2 DEVIATED NASAL SEPTUM: ICD-10-CM

## 2024-04-17 PROCEDURE — 99203 OFFICE O/P NEW LOW 30 MIN: CPT

## 2024-04-17 RX ORDER — METOPROLOL SUCCINATE 25 MG/1
25 TABLET, EXTENDED RELEASE ORAL
Qty: 30 | Refills: 0 | Status: COMPLETED | COMMUNITY
Start: 2022-07-04 | End: 2024-04-17

## 2024-04-17 RX ORDER — IBUPROFEN 800 MG/1
TABLET, FILM COATED ORAL
Refills: 0 | Status: ACTIVE | COMMUNITY

## 2024-04-17 NOTE — DATA REVIEWED
[de-identified] : PATIENT NAME: Sloane Staton PATIENT PHONE NUMBER: [phone] PATIENT ID: [id number] : 1975 DATE OF EXAM: 2024 R. Phys. Name: Francesco Kahn [facility] Phys. Address: [address] [place] [address], [place] [address], [place] [address] R. Phys. Phone: [phone] CT-MAXIFACIAL SINUS NON CONTRAST  HISTORY: J31.0 Chronic rhinitis  Technique: Helical CT scanning of the paranasal sinuses was performed without the administration of intravenous contrast. Coronal and sagittal reformatted images were done. This study was performed using automatic exposure control and an iterative reconstruction technique (radiation dose reduction software) to obtain a diagnostic image quality scan with patient dose as low as reasonably achievable. The mA and kV were adjusted according to patient size. The administered radiation dose was .98 mSv.  FINDINGS:  COMPARISON: None available  MAXILLARY SINUSES: The maxillary sinuses are well developed and well-aerated. The osteomeatal complexes are patent.  FRONTAL SINUSES: The frontal sinuses are well developed and well-aerated. The frontal recesses are patent.  ETHMOID COMPLEX: The ethmoid complex is clear.  SPHENOID SINUSES:The sphenoid sinuses are well developed and well-aerated. The sphenoethmoidal recesses are free of mucosal disease.  OTHER FINDINGS: There is leftward deviation of the nasal septum with a leftward bony spur without contact point. The sino-cranial and sino-orbital junctions are intact. The visualized portions of the intracranial and intraorbital compartments demonstrate no abnormality. The mastoid air cells are clear bilaterally.  IMPRESSION:   The paranasal sinuses are clear. The anterior and posterior drainage passageways are patent.  Leftward deviation of the nasal septum with leftward bony spur without contact point.  ICD 10 -  Deviated Septum J34.2  Signed by: Emory Pedroza MD Signed Date: 2024 2:56 PM EDT

## 2024-04-17 NOTE — PHYSICAL EXAM
[de-identified] : significant pain on opening in left TMJ [] : septum deviated to the left [Midline] : trachea located in midline position [de-identified] : malocclusion present [Normal] : salivary glands are normal

## 2024-04-17 NOTE — HISTORY OF PRESENT ILLNESS
[de-identified] : 48 year old female here for pain in left nose, face, and left ear for 3 months with increased pain when chewing and at night. Pt reports tooth extraction 3/22/2024 for infection, however pain remains. Pt was seen by ENT & Allergy Dr. Kahn 3/29/2024 and referred for CT Sinus and ENT. Reports nasal congestion, infrequent sinus pain and pressure, PND. Denies anterior rhinorrhea, anosmia recurrent sinus infections. Pt using Azelastine nasal spray daily as per Dr. Kahn, with no relief.  CT scan of sinuses 4/11/2024 IMPRESSION: The paranasal sinuses are clear. The anterior and posterior drainage passageways are patent. Leftward deviation of the nasal septum with leftward bony spur without contact point.

## 2024-04-17 NOTE — REASON FOR VISIT
[Initial Evaluation] : an initial evaluation for [[employer] ] : provided by [employer]   [Interpreters_IDNumber] : [id number] [Interpreters_FullName] : Renee [TWNoteComboBox1] : [place]

## 2024-04-17 NOTE — ASSESSMENT
[FreeTextEntry1] : Pain appears to be from left TMJ.  I have recommended TMJ Precautions.  Consider soft diet, warm packs, massage and NSAIDs if not contraindicated for TMJ pain.  Seek Dental consultation for continued problem. I recommended OMFS evaluation for further evaluation of TMJ.  The nasal pain is not likely from DNS.  If TMJ is improved it is possible the pain will decrease, but recommended Neurologic consulation for other causes of atypical facial pain.  CT shows no sinus disease.  F/u 2-3 months  Seek attention for epistaxis, nasal discharge, facial pain/pressure, fever, chills, headache.

## 2024-04-17 NOTE — CONSULT LETTER
[Dear  ___] : Dear  [unfilled], [Courtesy Letter:] : I had the pleasure of seeing your patient, [unfilled], in my office today. [Please see my note below.] : Please see my note below. [Sincerely,] : Sincerely, [FreeTextEntry2] : Dr. Yadira Moreau [FreeTextEntry3] : Daren Avina MD Otolaryngology at [address], [address] [place] [address] Phone: [phone] Fax: [phone]

## 2024-04-29 ENCOUNTER — EMERGENCY (EMERGENCY)
Facility: HOSPITAL | Age: 49
LOS: 1 days | Discharge: HOME | End: 2024-04-29
Attending: EMERGENCY MEDICINE
Payer: COMMERCIAL

## 2024-04-29 VITALS
SYSTOLIC BLOOD PRESSURE: 115 MMHG | OXYGEN SATURATION: 98 % | TEMPERATURE: 98 F | HEART RATE: 95 BPM | DIASTOLIC BLOOD PRESSURE: 83 MMHG | WEIGHT: 186.29 LBS | RESPIRATION RATE: 18 BRPM

## 2024-04-29 LAB
ALBUMIN SERPL ELPH-MCNC: 4.3 G/DL — SIGNIFICANT CHANGE UP (ref 3.3–5.2)
ALP SERPL-CCNC: 103 U/L — SIGNIFICANT CHANGE UP (ref 40–120)
ALT FLD-CCNC: 18 U/L — SIGNIFICANT CHANGE UP
AMORPH CRY # UR COMP ASSIST: PRESENT
ANION GAP SERPL CALC-SCNC: 14 MMOL/L — SIGNIFICANT CHANGE UP (ref 5–17)
APPEARANCE UR: ABNORMAL
APTT BLD: 40.2 SEC — HIGH (ref 24.5–35.6)
AST SERPL-CCNC: 21 U/L — SIGNIFICANT CHANGE UP
BACTERIA # UR AUTO: NEGATIVE /HPF — SIGNIFICANT CHANGE UP
BASOPHILS # BLD AUTO: 0.04 K/UL — SIGNIFICANT CHANGE UP (ref 0–0.2)
BASOPHILS NFR BLD AUTO: 0.3 % — SIGNIFICANT CHANGE UP (ref 0–2)
BILIRUB SERPL-MCNC: 0.3 MG/DL — LOW (ref 0.4–2)
BILIRUB UR-MCNC: NEGATIVE — SIGNIFICANT CHANGE UP
BUN SERPL-MCNC: 22 MG/DL — HIGH (ref 8–20)
CALCIUM SERPL-MCNC: 9.4 MG/DL — SIGNIFICANT CHANGE UP (ref 8.4–10.5)
CAST: 8 /LPF — HIGH (ref 0–4)
CHLORIDE SERPL-SCNC: 103 MMOL/L — SIGNIFICANT CHANGE UP (ref 96–108)
CO2 SERPL-SCNC: 24 MMOL/L — SIGNIFICANT CHANGE UP (ref 22–29)
COLOR SPEC: YELLOW — SIGNIFICANT CHANGE UP
CREAT SERPL-MCNC: 0.81 MG/DL — SIGNIFICANT CHANGE UP (ref 0.5–1.3)
DIFF PNL FLD: ABNORMAL
EGFR: 89 ML/MIN/1.73M2 — SIGNIFICANT CHANGE UP
EOSINOPHIL # BLD AUTO: 0.1 K/UL — SIGNIFICANT CHANGE UP (ref 0–0.5)
EOSINOPHIL NFR BLD AUTO: 0.7 % — SIGNIFICANT CHANGE UP (ref 0–6)
GLUCOSE SERPL-MCNC: 90 MG/DL — SIGNIFICANT CHANGE UP (ref 70–99)
GLUCOSE UR QL: NEGATIVE MG/DL — SIGNIFICANT CHANGE UP
HCG SERPL-ACNC: <4 MIU/ML — SIGNIFICANT CHANGE UP
HCT VFR BLD CALC: 41.6 % — SIGNIFICANT CHANGE UP (ref 34.5–45)
HGB BLD-MCNC: 14.2 G/DL — SIGNIFICANT CHANGE UP (ref 11.5–15.5)
IMM GRANULOCYTES NFR BLD AUTO: 0.2 % — SIGNIFICANT CHANGE UP (ref 0–0.9)
INR BLD: 1.09 RATIO — SIGNIFICANT CHANGE UP (ref 0.85–1.18)
KETONES UR-MCNC: ABNORMAL MG/DL
LEUKOCYTE ESTERASE UR-ACNC: ABNORMAL
LIDOCAIN IGE QN: 27 U/L — SIGNIFICANT CHANGE UP (ref 22–51)
LYMPHOCYTES # BLD AUTO: 22.9 % — SIGNIFICANT CHANGE UP (ref 13–44)
LYMPHOCYTES # BLD AUTO: 3.16 K/UL — SIGNIFICANT CHANGE UP (ref 1–3.3)
MCHC RBC-ENTMCNC: 29.7 PG — SIGNIFICANT CHANGE UP (ref 27–34)
MCHC RBC-ENTMCNC: 34.1 GM/DL — SIGNIFICANT CHANGE UP (ref 32–36)
MCV RBC AUTO: 87 FL — SIGNIFICANT CHANGE UP (ref 80–100)
MONOCYTES # BLD AUTO: 0.62 K/UL — SIGNIFICANT CHANGE UP (ref 0–0.9)
MONOCYTES NFR BLD AUTO: 4.5 % — SIGNIFICANT CHANGE UP (ref 2–14)
NEUTROPHILS # BLD AUTO: 9.87 K/UL — HIGH (ref 1.8–7.4)
NEUTROPHILS NFR BLD AUTO: 71.4 % — SIGNIFICANT CHANGE UP (ref 43–77)
NITRITE UR-MCNC: NEGATIVE — SIGNIFICANT CHANGE UP
PH UR: 8 — SIGNIFICANT CHANGE UP (ref 5–8)
PLATELET # BLD AUTO: 356 K/UL — SIGNIFICANT CHANGE UP (ref 150–400)
POTASSIUM SERPL-MCNC: 4.5 MMOL/L — SIGNIFICANT CHANGE UP (ref 3.5–5.3)
POTASSIUM SERPL-SCNC: 4.5 MMOL/L — SIGNIFICANT CHANGE UP (ref 3.5–5.3)
PROT SERPL-MCNC: 7.5 G/DL — SIGNIFICANT CHANGE UP (ref 6.6–8.7)
PROT UR-MCNC: 100 MG/DL
PROTHROM AB SERPL-ACNC: 12.1 SEC — SIGNIFICANT CHANGE UP (ref 9.5–13)
RBC # BLD: 4.78 M/UL — SIGNIFICANT CHANGE UP (ref 3.8–5.2)
RBC # FLD: 14.5 % — SIGNIFICANT CHANGE UP (ref 10.3–14.5)
RBC CASTS # UR COMP ASSIST: 592 /HPF — HIGH (ref 0–4)
SODIUM SERPL-SCNC: 141 MMOL/L — SIGNIFICANT CHANGE UP (ref 135–145)
SP GR SPEC: 1.02 — SIGNIFICANT CHANGE UP (ref 1–1.03)
SQUAMOUS # UR AUTO: 2 /HPF — SIGNIFICANT CHANGE UP (ref 0–5)
UROBILINOGEN FLD QL: 1 MG/DL — SIGNIFICANT CHANGE UP (ref 0.2–1)
WBC # BLD: 13.82 K/UL — HIGH (ref 3.8–10.5)
WBC # FLD AUTO: 13.82 K/UL — HIGH (ref 3.8–10.5)
WBC UR QL: 388 /HPF — HIGH (ref 0–5)

## 2024-04-29 PROCEDURE — 85025 COMPLETE CBC W/AUTO DIFF WBC: CPT

## 2024-04-29 PROCEDURE — 87086 URINE CULTURE/COLONY COUNT: CPT

## 2024-04-29 PROCEDURE — 99284 EMERGENCY DEPT VISIT MOD MDM: CPT | Mod: 25

## 2024-04-29 PROCEDURE — 80053 COMPREHEN METABOLIC PANEL: CPT

## 2024-04-29 PROCEDURE — 36415 COLL VENOUS BLD VENIPUNCTURE: CPT

## 2024-04-29 PROCEDURE — 83690 ASSAY OF LIPASE: CPT

## 2024-04-29 PROCEDURE — 74177 CT ABD & PELVIS W/CONTRAST: CPT | Mod: 26,MC

## 2024-04-29 PROCEDURE — 85730 THROMBOPLASTIN TIME PARTIAL: CPT

## 2024-04-29 PROCEDURE — 85610 PROTHROMBIN TIME: CPT

## 2024-04-29 PROCEDURE — 96374 THER/PROPH/DIAG INJ IV PUSH: CPT | Mod: XU

## 2024-04-29 PROCEDURE — 99285 EMERGENCY DEPT VISIT HI MDM: CPT

## 2024-04-29 PROCEDURE — 87186 SC STD MICRODIL/AGAR DIL: CPT

## 2024-04-29 PROCEDURE — 96375 TX/PRO/DX INJ NEW DRUG ADDON: CPT

## 2024-04-29 PROCEDURE — T1013: CPT

## 2024-04-29 PROCEDURE — 81001 URINALYSIS AUTO W/SCOPE: CPT

## 2024-04-29 PROCEDURE — 74177 CT ABD & PELVIS W/CONTRAST: CPT | Mod: MC

## 2024-04-29 PROCEDURE — 84702 CHORIONIC GONADOTROPIN TEST: CPT

## 2024-04-29 RX ORDER — SODIUM CHLORIDE 9 MG/ML
1000 INJECTION INTRAMUSCULAR; INTRAVENOUS; SUBCUTANEOUS ONCE
Refills: 0 | Status: COMPLETED | OUTPATIENT
Start: 2024-04-29 | End: 2024-04-29

## 2024-04-29 RX ORDER — ACETAMINOPHEN 500 MG
1000 TABLET ORAL ONCE
Refills: 0 | Status: COMPLETED | OUTPATIENT
Start: 2024-04-29 | End: 2024-04-29

## 2024-04-29 RX ORDER — ONDANSETRON 8 MG/1
4 TABLET, FILM COATED ORAL ONCE
Refills: 0 | Status: COMPLETED | OUTPATIENT
Start: 2024-04-29 | End: 2024-04-29

## 2024-04-29 RX ORDER — LEVOFLOXACIN 5 MG/ML
1 INJECTION, SOLUTION INTRAVENOUS
Qty: 7 | Refills: 0
Start: 2024-04-29 | End: 2024-05-05

## 2024-04-29 RX ADMIN — Medication 400 MILLIGRAM(S): at 15:51

## 2024-04-29 RX ADMIN — ONDANSETRON 4 MILLIGRAM(S): 8 TABLET, FILM COATED ORAL at 15:48

## 2024-04-29 RX ADMIN — SODIUM CHLORIDE 1000 MILLILITER(S): 9 INJECTION INTRAMUSCULAR; INTRAVENOUS; SUBCUTANEOUS at 15:47

## 2024-04-29 NOTE — ED PROVIDER NOTE - CARE PROVIDER_API CALL
[facility]  [address] [place] [id number]  Phone: [phone]  Fax: [phone]  Follow Up Time:

## 2024-04-29 NOTE — ED ADULT TRIAGE NOTE - CHIEF COMPLAINT QUOTE
pain with urination, hematuria, headache, vaginal pain. x 3 days, bleeding started today. hx of hysterectomy.

## 2024-04-29 NOTE — ED PROVIDER NOTE - CLINICAL SUMMARY MEDICAL DECISION MAKING FREE TEXT BOX
Patient has persistent pelvic pain followed by hematuria we will check her labs and do a CT abdomen pelvis to rule out kidney stone pyelocnephritis versus renal cell carcinoma.  Patient has a known history of proteinuria for a long time without any kidney dysfunction.  Patient states that [place] and has never had a kidney problem in the past. Patient has persistent pelvic pain followed by hematuria we will check her labs and do a CT abdomen pelvis to rule out kidney stone pyelonephritis versus renal cell carcinoma.  Patient has a known history of proteinuria for a long time without any kidney dysfunction.  Patient states that she [place] biopsy [place] country [place] and has never had a kidney problem in the past.    Patient resting comfortably no acute distress at this time.  Labs reviewed.  UA reviewed–positive for infection, will treat with antibiotics.  CT scan shows 3 mm right kidney stone within the kidney with no evidence of ureteral stone as well as bladder wall thickening consistent with cystitis/UTI.  Patient stable for discharge with antibiotics sent to pharmacy.  Return precautions discussed with patient. Abx selection as per Dr. Salter.

## 2024-04-29 NOTE — ED ADULT NURSE NOTE - OBJECTIVE STATEMENT
A&Ox4, RR even and unlabored on RA. Skin is warm and dry. PT C/O lower abd pain associated with nausea and hematuria. Denies fevers. Hx of hysterectomy.

## 2024-04-29 NOTE — ED PROVIDER NOTE - GENITOURINARY [+], MLM
UNK
SBP 80s in ED improved with IVF. No current dizziness  now normotensive     -hold antihypertensives iso hypotension  -cardiology consulted; recs appreciated    #Bradycardia  resolved    TSH mildly elevated on synthroid  Check cortisol, echo, tele
HEMATURIA/PELVIC PAIN
On metformin 1g BID, januvia 100mg qd, glipizide 5mg qd - hold inpatient. A1C 6.4    - ISS

## 2024-04-29 NOTE — ED ADULT NURSE NOTE - NSFALLUNIVINTERV_ED_ALL_ED
Bed/Stretcher in lowest position, wheels locked, appropriate side rails in place/Call bell, personal items and telephone in reach/Instruct patient to call for assistance before getting out of bed/chair/stretcher/Non-slip footwear applied when patient is off stretcher/[place] to call system/Physically safe environment - no spills, clutter or unnecessary equipment/Purposeful proactive rounding/Room/bathroom lighting operational, light cord in reach

## 2024-04-29 NOTE — ED PROVIDER NOTE - OBJECTIVE STATEMENT
49-year-old female with history of fibroids status post hysterectomy and anemia, proteinuria in the past but no kidney dysfunction, kidney stones in the past presents with pelvic pain for 3 days followed by blood in the urine today.  Patient had 1 episode of nausea and vomiting but no diarrhea no fever or chills.  Patient had a hysterectomy 1-1/2 years ago and her current treatment.  No fall no trauma.  Pain is suprapubic and pelvic and nonradiating in nature.  She denies any weight loss.    ED  Maxi

## 2024-04-29 NOTE — ED PROVIDER NOTE - PROGRESS NOTE DETAILS
I performed the initial face to face bedside interview with this patient regarding history of present illness, review of symptoms and past medical, social and family history.  I completed an independent physical examination.  I was the initial provider who evaluated this patient.  The history, review of symptoms and examination was documented by me.  I have signed out the follow up of any pending tests (i.e. labs, radiological studies) to the PA/ resident.  I have discussed the patient’s plan of care and disposition with the PA/ resident.

## 2024-04-29 NOTE — ED PROVIDER NOTE - PATIENT PORTAL LINK FT
You can access the FollowMyHealth Patient Portal offered by [facility] by registering at the following website: [email or web address]. By joining [facility]’s FollowMyHealth portal, you will also be able to view your health information using other applications (apps) compatible with our system.

## 2024-05-02 ENCOUNTER — APPOINTMENT (OUTPATIENT)
Dept: UROLOGY | Facility: CLINIC | Age: 49
End: 2024-05-02
Payer: COMMERCIAL

## 2024-05-02 VITALS
BODY MASS INDEX: 30.32 KG/M2 | DIASTOLIC BLOOD PRESSURE: 78 MMHG | SYSTOLIC BLOOD PRESSURE: 110 MMHG | WEIGHT: 182 LBS | HEIGHT: 65 IN

## 2024-05-02 DIAGNOSIS — R39.89 OTHER SYMPTOMS AND SIGNS INVOLVING THE GENITOURINARY SYSTEM: ICD-10-CM

## 2024-05-02 DIAGNOSIS — N39.0 URINARY TRACT INFECTION, SITE NOT SPECIFIED: ICD-10-CM

## 2024-05-02 DIAGNOSIS — R35.0 FREQUENCY OF MICTURITION: ICD-10-CM

## 2024-05-02 LAB
-  AMOXICILLIN/CLAVULANIC ACID: SIGNIFICANT CHANGE UP
-  AMPICILLIN/SULBACTAM: SIGNIFICANT CHANGE UP
-  AMPICILLIN: SIGNIFICANT CHANGE UP
-  AZTREONAM: SIGNIFICANT CHANGE UP
-  CEFAZOLIN: SIGNIFICANT CHANGE UP
-  CEFEPIME: SIGNIFICANT CHANGE UP
-  CEFOXITIN: SIGNIFICANT CHANGE UP
-  CEFTRIAXONE: SIGNIFICANT CHANGE UP
-  CEFUROXIME: SIGNIFICANT CHANGE UP
-  CIPROFLOXACIN: SIGNIFICANT CHANGE UP
-  ERTAPENEM: SIGNIFICANT CHANGE UP
-  GENTAMICIN: SIGNIFICANT CHANGE UP
-  IMIPENEM: SIGNIFICANT CHANGE UP
-  LEVOFLOXACIN: SIGNIFICANT CHANGE UP
-  MEROPENEM: SIGNIFICANT CHANGE UP
-  NITROFURANTOIN: SIGNIFICANT CHANGE UP
-  PIPERACILLIN/TAZOBACTAM: SIGNIFICANT CHANGE UP
-  TOBRAMYCIN: SIGNIFICANT CHANGE UP
-  TRIMETHOPRIM/SULFAMETHOXAZOLE: SIGNIFICANT CHANGE UP
BILIRUB UR QL STRIP: NORMAL
CLARITY UR: CLEAR
COLLECTION METHOD: NORMAL
CULTURE RESULTS: ABNORMAL
GLUCOSE UR-MCNC: NORMAL
HCG UR QL: 0.2 EU/DL
HGB UR QL STRIP.AUTO: NORMAL
KETONES UR-MCNC: NORMAL
LEUKOCYTE ESTERASE UR QL STRIP: NORMAL
METHOD TYPE: SIGNIFICANT CHANGE UP
NITRITE UR QL STRIP: NORMAL
ORGANISM # SPEC MICROSCOPIC CNT: ABNORMAL
ORGANISM # SPEC MICROSCOPIC CNT: SIGNIFICANT CHANGE UP
PH UR STRIP: 6
PROT UR STRIP-MCNC: NORMAL
SP GR UR STRIP: 1.02
SPECIMEN SOURCE: SIGNIFICANT CHANGE UP

## 2024-05-02 PROCEDURE — 81003 URINALYSIS AUTO W/O SCOPE: CPT | Mod: QW

## 2024-05-02 PROCEDURE — 99203 OFFICE O/P NEW LOW 30 MIN: CPT | Mod: 25

## 2024-05-02 RX ORDER — MELOXICAM 7.5 MG/1
7.5 TABLET ORAL
Refills: 0 | Status: COMPLETED | COMMUNITY
End: 2024-05-02

## 2024-05-02 NOTE — HISTORY OF PRESENT ILLNESS
[FreeTextEntry1] : Pt reports 3 days ago she went to [facility] ED with c/o abdomen and lots of blood in urine x 2 days, clots. She is on day 3 of Levaquin. Urine culture + 10, 000 - 49, 000 E. Coli. She had a CT which showed a 3 mm right renal stone.  With c/o dysuria, urgency, awakens once per night to void. Some chills no fever. Denies fever, h/o kidney stones.   H/o hysterectomy, oophorectomy Nonsmoker

## 2024-05-02 NOTE — ASSESSMENT
[FreeTextEntry1] : Dysuria, urgency, gross hematuria, clots, kidney stone   Complete Levaquin Repeat urine cultures after antibiotic complete Lab: Urine culture RTO for cystoscopy 2 weeks d/t gross hematuria, clots  Records reviewed: CT abd/pelvis 4/28/76 3 mm nonobstructing stone right upper pole of kidney. No hydro; Incompletely distended thick-walled bladder with hyperenhancement. Urine culture 10, 000 - 49, 000 E.Coli, sensitive to Levaquin U/A: Large blood, Large leuk, , , amorphous salts crystals

## 2024-05-02 NOTE — PHYSICAL EXAM
[Normal Appearance] : normal appearance [Well Groomed] : well groomed [General Appearance - In No Acute Distress] : no acute distress [Respiration, Rhythm And Depth] : normal respiratory rhythm and effort [Exaggerated Use Of Accessory Muscles For Inspiration] : no accessory muscle use [Normal Station and Gait] : the gait and station were normal for the patient's age [Skin Color & Pigmentation] : normal skin color and pigmentation [] : no rash [No Focal Deficits] : no focal deficits [Oriented To Time, Place, And Person] : oriented to person, place, and time [Affect] : the affect was normal [Mood] : the mood was normal [Not Anxious] : not anxious

## 2024-05-02 NOTE — REASON FOR VISIT
[Initial Visit ___] : [unfilled] is here today for an initial visit  for [unfilled] [Time Spent: ____ minutes] : Total time spent using  services: [unfilled] minutes. The patient's primary language is not English thus required  services. [Interpreters_IDNumber] : [id number] [Interpreters_FullName] : Chris [TWNoteComboBox1] : Ukrainian

## 2024-05-03 ENCOUNTER — APPOINTMENT (OUTPATIENT)
Dept: GASTROENTEROLOGY | Facility: CLINIC | Age: 49
End: 2024-05-03
Payer: COMMERCIAL

## 2024-05-03 VITALS — RESPIRATION RATE: 14 BRPM | WEIGHT: 182 LBS | BODY MASS INDEX: 30.32 KG/M2 | HEIGHT: 65 IN

## 2024-05-03 VITALS
OXYGEN SATURATION: 96 % | RESPIRATION RATE: 14 BRPM | SYSTOLIC BLOOD PRESSURE: 129 MMHG | DIASTOLIC BLOOD PRESSURE: 95 MMHG | HEART RATE: 86 BPM | TEMPERATURE: 97.7 F

## 2024-05-03 DIAGNOSIS — R11.2 NAUSEA WITH VOMITING, UNSPECIFIED: ICD-10-CM

## 2024-05-03 DIAGNOSIS — K59.09 OTHER CONSTIPATION: ICD-10-CM

## 2024-05-03 DIAGNOSIS — K21.9 GASTRO-ESOPHAGEAL REFLUX DISEASE W/OUT ESOPHAGITIS: ICD-10-CM

## 2024-05-03 PROCEDURE — 99214 OFFICE O/P EST MOD 30 MIN: CPT

## 2024-05-03 RX ORDER — FAMOTIDINE 20 MG/1
20 TABLET, FILM COATED ORAL
Refills: 0 | Status: DISCONTINUED | COMMUNITY
End: 2024-05-03

## 2024-05-03 RX ORDER — OMEPRAZOLE 40 MG/1
40 CAPSULE, DELAYED RELEASE ORAL TWICE DAILY
Qty: 60 | Refills: 1 | Status: ACTIVE | COMMUNITY
Start: 2024-05-03 | End: 1900-01-01

## 2024-05-03 NOTE — HISTORY OF PRESENT ILLNESS
[FreeTextEntry1] : Ms. Sloane Staton is a 49-year-old woman with PMH of HTN, chronic GERD who presents today with complaints of abdominal pain and reflux. Patient reports she had been having severe epigastric pain X 2 - 3 months. She also reports severe acid reflux. She was previously on omeprazole 40mg tablets, which she took intermittently when she was experiencing symptoms. However, she reports she felt the omeprazole was ineffective, visited her PCP who prescribed her Carafate BID. She has also tried OTC Pepcid which was ineffective at relieving her symptoms. Patient also reports frequent nausea, followed by emesis after food intake. She reports intermittent hematemesis when she has excessive episodes of emesis. However, she does report suffering constipation recently after being prescribed Levofloxacin for UTI last week. Describes her stools as small and hard. Denies dysphagia, no significant weight loss, fever, chills, hematochezia or melena.  [de-identified] : EGD-Colonoscopy Report    Date: 2022    Patient Name: CARRIE REILLY    MRN: [id number]    Account Number:    [id number]    Gender: Female     (age): 1975 (47)    EGD Instrument(s):    GIF H190 (8040)([id number])    Colonoscopy Instrument(s):    PCF H190DL (5799)([id number])    Attending/Fellow:    Zac Lemos MD        Procedure Room #:    PROCEDURE ROOM 3        ASA Class:    P2 - 2022 11:04 AM Zac Lemos MD    History of Present Illness:    Pt with chronic dyspepsia and GERD and CRC screening    Administered Medications:    As per Anesthesiology Record    EGD Indications:    Dyspepsia: 536.8 - R10.13    Gastroesophageal reflux disease: 530.81 - K21.9    Colonoscopy Indications:    Screening Colonoscopy: V76.51 - Z12.11    General Procedure:    The procedure, indications, preparation and potential complications were  explained to the patient, who indicated understanding and signed the  corresponding consent forms. MAC was administered by anesthesiologist.  Continuous pulse oximetry and blood pressure monitoring were used throughout the  procedure. Supplemental oxygen was used. Withdrawal time was 6 minutes.    EGD    EGD Procedure:    Patient was placed in left lateral decubitus position. The endoscope was  introduced through mouth and advanced under direct visualization until second  part of the duodenum reached. The Z-line was noted at 40 centimeters. Patient  tolerance to the procedure was good. The procedure was not difficult. Blood loss  was none.    EGD Findings:    Esophagus Mucosa Normal mucosa was noted in the whole esophagus.    Stomach Mucosa Normal mucosa was noted in the whole stomach. Biopsies were  obtained to evaluate for H.Pylori infection.Multiple cold forceps biopsies were  performed for histology.    Normal mucosa and Normal mucosa was noted in the whole stomach. No hiatal  hernia.    Duodenum Mucosa Normal mucosa was noted in the whole duodenum.    EGD Impressions:    Normal mucosa in the whole examined duodenum.    Normal mucosa in the antrum and whole stomach. (Biopsy).    Normal mucosa and Normal mucosa in the whole stomach, cardia and fundus.    Normal mucosa in the whole esophagus.    EGD Limitations/Complications:    There were no apparent limitations or complications    Colonoscopy    Colonoscopy Procedure:    This is a  average risk patient  undergoing screening colonoscopy. Prior  colonoscopy was performed never ago.  The quality of preparation was 6-9 on the  BBP scale/adequate. Patient was placed in left lateral decubitus position. The  colonoscope was introduced through rectum and advanced under direct  visualization until cecum reached at a distance of 100. Visualization of None  was poor. The appendiceal orifice and the ileocecal valve were identified. The  terminal ileum was identified. Retroflexion was not performed in the rectum for  this patient. Patient tolerance to the procedure was excellent. The procedure  was not difficult. Digital exam was normal with the following finding(s): Normal  colonoscopy to the cecum..    Canon Bowel Preparation Score:    Using the Canon Bowel Preparation Score, each segment of the colon was graded  as follows:    Left Colon: Good, 2 Transverse Colon: Good, 2  Right Colon: Good, 2    Colonoscopy Findings:    Mucosa Normal mucosa was noted in the whole colon. There were no AVMs, masses,  evidence of colitis or other abnormalities seen. Retroflexion of the scope in  the rectum revealed no abnormalities.    Normal mucosa was noted in the whole colon. There were no AVMs, masses, evidence  of colitis or other abnormalities seen. Retroflexion of the scope in the rectum  revealed no abnormalities.    Colonoscopy Impressions:    Normal mucosa in the whole colon.    Normal mucosa in the whole colon.        Colonoscopy Limitations/Complications:    There were no apparent limitations or complications    Plan:    Await pathology results.    Colonoscopy in 10 years, or sooner if clinically indicated.    Follow-up office visit in 6-8 weeks    Additional Notes:    Normal EGD and Colonoscopy.    Attending Statement:    I was present and participated during the entire procedure, including non-key  portions.    Zac Lemos MD    Version 1, Electronically signed on 2022 11:34:51 AM by Zac Lemos MD     [de-identified] : Evelyn Accession Number : [id number] Patient:   CARRIE REILLY   Accession:                             [id number]  Collected Date/Time:                   9/26/2022 17:00 EDT Received Date/Time:                    9/27/2022 09:01 EDT  Surgical Pathology Report - Auth (Verified)  Specimen(s) Submitted 1  Antrum biopsy 2  Body biopsy  Final Diagnosis  1. Stomach,  antrum , biopsy - Mild chronic inactive gastritis  - No Helicobacter pylori  identified  (immunostain)  2. Stomach, body , biopsy - Oxyntic mucosa , unremarkable - No Helicobacter pylori  identified  (immunostain) Verified by: Nilo Duncan M.D. (Electronic Signature) Reported on: 09/28/22 18:35 EDT, [facility], [address] [place] [address]

## 2024-05-03 NOTE — PHYSICAL EXAM
[Alert] : alert [Normal Voice/Communication] : normal voice/communication [Healthy Appearing] : healthy appearing [No Acute Distress] : no acute distress [Sclera] : the sclera and conjunctiva were normal [Hearing Threshold Finger Rub Not [place]] : hearing was normal [Normal Lips/Gums] : the lips and gums were normal [Oropharynx] : the oropharynx was normal [Normal Appearance] : the appearance of the neck was normal [No Neck Mass] : no neck mass was observed [No Respiratory Distress] : no respiratory distress [No Acc Muscle Use] : no accessory muscle use [Respiration, Rhythm And Depth] : normal respiratory rhythm and effort [Auscultation Breath Sounds / Voice Sounds] : lungs were clear to auscultation bilaterally [Heart Rate And Rhythm] : heart rate was normal and rhythm regular [Normal S1, S2] : normal S1 and S2 [Murmurs] : no murmurs [Epigastric] : epigastric [Oriented To Time, Place, And Person] : oriented to person, place, and time

## 2024-05-03 NOTE — REVIEW OF SYSTEMS
[Abdominal Pain] : abdominal pain [Vomiting] : vomiting [Constipation] : constipation [Heartburn] : heartburn [Negative] : Heme/Lymph

## 2024-05-03 NOTE — ADDENDUM
[FreeTextEntry1] : I, Sharron Curran NP, acted as scribe for Dr. KINGSLEY Lemos for this patient encounter.

## 2024-05-03 NOTE — ASSESSMENT
[FreeTextEntry1] : Will plan for EGD with Dr. Lemos  Recommend omeprazole 40mg BID ondansetron 4mg tablets PRN for nausea Benefiber 1 tablespoon daily.  I evaluated this patient with my ACP and agree with the above assessment and management plan for repeat upper endoscopy for further evaluation of her recurrent upper GI symptoms.  Her ASA classification is 1 and she is medically optimized for the proposed upper endoscopy and seemed to understand all of the above instructions, information, and management plan which were explained to her today in South Korean by myself who speaks [place].

## 2024-05-06 RX ORDER — ONDANSETRON 4 MG/1
4 TABLET, ORALLY DISINTEGRATING ORAL 4 TIMES DAILY
Qty: 120 | Refills: 0 | Status: ACTIVE | COMMUNITY
Start: 2024-05-06 | End: 1900-01-01

## 2024-05-13 LAB — BACTERIA UR CULT: NORMAL

## 2024-05-21 ENCOUNTER — APPOINTMENT (OUTPATIENT)
Dept: UROLOGY | Facility: CLINIC | Age: 49
End: 2024-05-21
Payer: COMMERCIAL

## 2024-05-21 VITALS
DIASTOLIC BLOOD PRESSURE: 65 MMHG | HEIGHT: 65 IN | SYSTOLIC BLOOD PRESSURE: 98 MMHG | HEART RATE: 66 BPM | BODY MASS INDEX: 30.49 KG/M2 | WEIGHT: 183 LBS

## 2024-05-21 DIAGNOSIS — R31.0 GROSS HEMATURIA: ICD-10-CM

## 2024-05-21 LAB
BILIRUB UR QL STRIP: NORMAL
CLARITY UR: CLEAR
COLLECTION METHOD: NORMAL
GLUCOSE UR-MCNC: NORMAL
HCG UR QL: 0.2 EU/DL
HGB UR QL STRIP.AUTO: NORMAL
KETONES UR-MCNC: NORMAL
LEUKOCYTE ESTERASE UR QL STRIP: NORMAL
NITRITE UR QL STRIP: NORMAL
PH UR STRIP: 6.5
PROT UR STRIP-MCNC: NORMAL
SP GR UR STRIP: 1.03

## 2024-05-21 PROCEDURE — 52000 CYSTOURETHROSCOPY: CPT

## 2024-05-21 PROCEDURE — 81003 URINALYSIS AUTO W/O SCOPE: CPT | Mod: QW

## 2024-06-05 ENCOUNTER — APPOINTMENT (OUTPATIENT)
Dept: NEUROLOGY | Facility: CLINIC | Age: 49
End: 2024-06-05
Payer: COMMERCIAL

## 2024-06-05 VITALS
DIASTOLIC BLOOD PRESSURE: 80 MMHG | WEIGHT: 184 LBS | HEIGHT: 65 IN | SYSTOLIC BLOOD PRESSURE: 110 MMHG | BODY MASS INDEX: 30.66 KG/M2

## 2024-06-05 DIAGNOSIS — G43.909 MIGRAINE, UNSPECIFIED, NOT INTRACTABLE, W/OUT STATUS MIGRAINOSUS: ICD-10-CM

## 2024-06-05 PROCEDURE — 99213 OFFICE O/P EST LOW 20 MIN: CPT

## 2024-06-05 PROCEDURE — G2211 COMPLEX E/M VISIT ADD ON: CPT

## 2024-06-05 RX ORDER — SUCRALFATE 1 G/1
1 TABLET ORAL
Refills: 0 | Status: ACTIVE | COMMUNITY

## 2024-06-05 RX ORDER — TOPIRAMATE 25 MG/1
25 TABLET, FILM COATED ORAL
Qty: 90 | Refills: 1 | Status: ACTIVE | COMMUNITY
Start: 2023-01-19 | End: 1900-01-01

## 2024-06-05 RX ORDER — LEVOFLOXACIN 750 MG/1
750 TABLET, FILM COATED ORAL
Refills: 0 | Status: COMPLETED | COMMUNITY
End: 2024-06-05

## 2024-06-05 RX ORDER — SUMATRIPTAN 100 MG/1
100 TABLET, FILM COATED ORAL
Qty: 9 | Refills: 5 | Status: ACTIVE | COMMUNITY
Start: 2023-12-05 | End: 1900-01-01

## 2024-06-05 RX ORDER — ROSUVASTATIN CALCIUM 10 MG/1
10 TABLET, FILM COATED ORAL
Refills: 0 | Status: ACTIVE | COMMUNITY

## 2024-06-05 NOTE — ASSESSMENT
[FreeTextEntry1] : This is a 49-year-old woman with a long history of headache. The description is suggestive of migraine that has evolved into a chronic daily pattern. Imaging has been negative.  Ubrelvy had been working for her to abort the headaches. It is not covered by her insurance, however. I had started her on topiramate for prophylaxis. I will continue the current regimen of topiramate. I advised her to call for refills if she should run out prior to her next visit.  I will continue sumatriptan to be used as needed.  I will see her back in 6 months.

## 2024-06-05 NOTE — HISTORY OF PRESENT ILLNESS
[FreeTextEntry1] : I saw this patient in the office today.  As you recall, she described headaches. This has been going on for many years. It had become daily. At least 3 times per week it is quite severe. When severe it is associated with dizziness and photophobia. She reported that she was given Ubrelvy to use as needed. This has been working fairly well. The severe headaches continued to come at least 3 times per week.  I had started her on topiramate for prophylaxis.  12/5/2023 visit: She reports that the headache frequency was improved with topiramate, however, she discontinued it when she ran out. In addition, she reports that Ubrelvy is not covered by her insurance.  6/5/2024 visit: I had tried her on sumatriptan and as Ubrelvy was not covered by her insurance. She now reports that the headaches are down to once or twice per week. They respond to sumatriptan.

## 2024-06-05 NOTE — DATA REVIEWED
Appropriate/Calm [de-identified] : Head CT was performed on 9/4/2022.\par  The study was unremarkable.

## 2024-06-25 ENCOUNTER — APPOINTMENT (OUTPATIENT)
Dept: UROLOGY | Facility: CLINIC | Age: 49
End: 2024-06-25
Payer: COMMERCIAL

## 2024-06-25 DIAGNOSIS — R30.0 DYSURIA: ICD-10-CM

## 2024-06-25 DIAGNOSIS — N20.0 CALCULUS OF KIDNEY: ICD-10-CM

## 2024-06-25 PROCEDURE — 99213 OFFICE O/P EST LOW 20 MIN: CPT | Mod: 25

## 2024-06-26 LAB
APPEARANCE: CLEAR
BILIRUBIN URINE: NEGATIVE
BLOOD URINE: NEGATIVE
COLOR: YELLOW
GLUCOSE QUALITATIVE U: NEGATIVE MG/DL
KETONES URINE: NEGATIVE MG/DL
LEUKOCYTE ESTERASE URINE: NEGATIVE
NITRITE URINE: NEGATIVE
PH URINE: 5.5
PROTEIN URINE: NEGATIVE MG/DL
SPECIFIC GRAVITY URINE: 1.02
UROBILINOGEN URINE: 0.2 MG/DL

## 2024-07-25 ENCOUNTER — APPOINTMENT (OUTPATIENT)
Dept: SURGERY | Facility: CLINIC | Age: 49
End: 2024-07-25
Payer: COMMERCIAL

## 2024-07-25 VITALS
HEIGHT: 64 IN | HEART RATE: 74 BPM | DIASTOLIC BLOOD PRESSURE: 69 MMHG | WEIGHT: 190 LBS | SYSTOLIC BLOOD PRESSURE: 104 MMHG | RESPIRATION RATE: 16 BRPM | BODY MASS INDEX: 32.44 KG/M2 | OXYGEN SATURATION: 100 % | TEMPERATURE: 98.2 F

## 2024-07-25 DIAGNOSIS — K21.9 GASTRO-ESOPHAGEAL REFLUX DISEASE W/OUT ESOPHAGITIS: ICD-10-CM

## 2024-07-25 DIAGNOSIS — Z98.84 BARIATRIC SURGERY STATUS: ICD-10-CM

## 2024-07-25 PROCEDURE — 99205 OFFICE O/P NEW HI 60 MIN: CPT

## 2024-07-25 NOTE — CONSULT LETTER
[Dear  ___] : Dear ~ANNELISE, [Consult Letter:] : I had the pleasure of evaluating your patient, [unfilled]. [Please see my note below.] : Please see my note below. [Consult Closing:] : Thank you very much for allowing me to participate in the care of this patient.  If you have any questions, please do not hesitate to contact me. [Sincerely,] : Sincerely, [FreeTextEntry3] : Deny Rolle MD, FACS Director of Bariatric Surgery [facility]  Assistant Professor of Surgery  [employer] [facility]

## 2024-07-25 NOTE — PHYSICAL EXAM
[JVD] : no jugular venous distention  [No Rash or Lesion] : No rash or lesion [Alert] : alert [Oriented to Person] : oriented to person [Oriented to Place] : oriented to place [Oriented to Time] : oriented to time [Calm] : calm [de-identified] : No acute distress [de-identified] : No respiratory distress [de-identified] : Regular rate [de-identified] : soft, nontender. no rebound or guarding. [de-identified] : normal range of motion

## 2024-07-25 NOTE — PLAN
[FreeTextEntry1] : EGD planned for 8/5 Upper GI x-ray Nutrition labs ordered - followup results Return to office when workup complete

## 2024-07-25 NOTE — HISTORY OF PRESENT ILLNESS
[de-identified] : Ms. REILLY is a 49 year old woman s/p sleeve gastrectomy with moderate/severe reflux, referred by Yadira Moreau for evaluation. Reports occasional dysphagia and frequent regurgitation. Denies aspiration pneumonia. Denies epigastric pain. Today, the patient denies pain. Denies fever/chills/nausea/emesis, denies changes in bowel or bladder habits. Tolerating diet, reports normal bowel movements.   denies smoking prior cholecystectomy   2022 CT reviewed - small hiatal hernia appreciated

## 2024-07-25 NOTE — ASSESSMENT
[FreeTextEntry1] : Ms. REILLY is a 49 year old woman s/p sleeve gastrectomy with moderate/severe reflux. We discussed the options of revision of sleeve gastrectomy, gastrojejunostomy and nonoperative management. The risks/benefits and alternatives were discussed at length and all questions were answered. The patient appears to understand and wishes to proceed with robotic gastrojejunostomy vs revision of sleeve gastrectomy pending workup, which will include UGI x-ray and EGD.   EGD scheduled for Aug 5 w/ [facility] GI team  Nutrition labs ordered - followup results

## 2024-07-26 LAB
25(OH)D3 SERPL-MCNC: 31.7 NG/ML
ALBUMIN SERPL ELPH-MCNC: 4.5 G/DL
ALP BLD-CCNC: 122 U/L
ALT SERPL-CCNC: 16 U/L
ANION GAP SERPL CALC-SCNC: 11 MMOL/L
APTT BLD: 37.8 SEC
AST SERPL-CCNC: 19 U/L
BILIRUB DIRECT SERPL-MCNC: 0.1 MG/DL
BILIRUB INDIRECT SERPL-MCNC: 0.2 MG/DL
BILIRUB SERPL-MCNC: 0.2 MG/DL
BUN SERPL-MCNC: 22 MG/DL
CALCIUM SERPL-MCNC: 9.9 MG/DL
CHLORIDE SERPL-SCNC: 108 MMOL/L
CHOLEST SERPL-MCNC: 262 MG/DL
CO2 SERPL-SCNC: 22 MMOL/L
CREAT SERPL-MCNC: 1.03 MG/DL
EGFR: 67 ML/MIN/1.73M2
ESTIMATED AVERAGE GLUCOSE: 114 MG/DL
FERRITIN SERPL-MCNC: 15 NG/ML
FOLATE SERPL-MCNC: 8.2 NG/ML
GLUCOSE SERPL-MCNC: 91 MG/DL
HBA1C MFR BLD HPLC: 5.6 %
HCG SERPL QL: NEGATIVE
HDLC SERPL-MCNC: 62 MG/DL
INR PPP: 1.03 RATIO
IRON SATN MFR SERPL: 17 %
IRON SERPL-MCNC: 68 UG/DL
LDLC SERPL CALC-MCNC: 187 MG/DL
MAGNESIUM SERPL-MCNC: 2 MG/DL
NONHDLC SERPL-MCNC: 201 MG/DL
PAPP-A SERPL-ACNC: 4 MIU/ML
PHOSPHATE SERPL-MCNC: 4 MG/DL
POTASSIUM SERPL-SCNC: 4.7 MMOL/L
PROT SERPL-MCNC: 7.4 G/DL
PT BLD: 11.6 SEC
SODIUM SERPL-SCNC: 141 MMOL/L
T4 SERPL-MCNC: 5 UG/DL
TIBC SERPL-MCNC: 406 UG/DL
TRIGL SERPL-MCNC: 82 MG/DL
TSH SERPL-ACNC: 1.2 UIU/ML
UIBC SERPL-MCNC: 339 UG/DL
VIT B12 SERPL-MCNC: 968 PG/ML

## 2024-07-29 LAB — VIT B1 SERPL-MCNC: 154.4 NMOL/L

## 2024-07-30 LAB
A-TOCOPHEROL VIT E SERPL-MCNC: 14.6 MG/L
BETA+GAMMA TOCOPHEROL SERPL-MCNC: 0.6 MG/L
COPPER SERPL-MCNC: 127 UG/DL
VIT A SERPL-MCNC: 62.7 UG/DL
VIT C SERPL-MCNC: 0.8 MG/DL
ZINC SERPL-MCNC: 112 UG/DL

## 2024-07-31 LAB
VIT B2 SERPL-MCNC: 337 UG/L
VIT B6 SERPL-MCNC: 26.2 UG/L

## 2024-08-01 LAB — MENADIONE SERPL-MCNC: 0.13 NG/ML

## 2024-08-04 ENCOUNTER — TRANSCRIPTION ENCOUNTER (OUTPATIENT)
Age: 49
End: 2024-08-04

## 2024-08-05 ENCOUNTER — APPOINTMENT (OUTPATIENT)
Dept: GASTROENTEROLOGY | Facility: GI CENTER | Age: 49
End: 2024-08-05

## 2024-08-05 ENCOUNTER — OUTPATIENT (OUTPATIENT)
Dept: OUTPATIENT SERVICES | Facility: HOSPITAL | Age: 49
LOS: 1 days | End: 2024-08-05
Payer: COMMERCIAL

## 2024-08-05 ENCOUNTER — RESULT REVIEW (OUTPATIENT)
Age: 49
End: 2024-08-05

## 2024-08-05 DIAGNOSIS — R10.13 EPIGASTRIC PAIN: ICD-10-CM

## 2024-08-05 DIAGNOSIS — K21.9 GASTRO-ESOPHAGEAL REFLUX DISEASE WITHOUT ESOPHAGITIS: ICD-10-CM

## 2024-08-05 PROCEDURE — 88305 TISSUE EXAM BY PATHOLOGIST: CPT

## 2024-08-05 PROCEDURE — 43239 EGD BIOPSY SINGLE/MULTIPLE: CPT

## 2024-08-05 PROCEDURE — 88342 IMHCHEM/IMCYTCHM 1ST ANTB: CPT | Mod: 26

## 2024-08-05 PROCEDURE — 88305 TISSUE EXAM BY PATHOLOGIST: CPT | Mod: 26

## 2024-08-05 PROCEDURE — 88342 IMHCHEM/IMCYTCHM 1ST ANTB: CPT

## 2024-08-05 NOTE — HISTORY OF PRESENT ILLNESS
[FreeTextEntry1] : Ms. Sloane Reilly is a 49-year-old woman with PMH of HTN, chronic GERD who presents today with complaints of abdominal pain and reflux. Patient reports she had been having severe epigastric pain X 2 - 3 months. She also reports severe acid reflux. She was previously on omeprazole 40mg tablets, which she took intermittently when she was experiencing symptoms. However, she reports she felt the omeprazole was ineffective, visited her PCP who prescribed her Carafate BID. She has also tried OTC Pepcid which was ineffective at relieving her symptoms. Patient also reports frequent nausea, followed by emesis after food intake. She reports intermittent hematemesis when she has excessive episodes of emesis. However, she does report suffering constipation recently after being prescribed Levofloxacin for UTI last week. Describes her stools as small and hard. Denies dysphagia, no significant weight loss, fever, chills, hematochezia or melena. Gastroenterology Summary   Upper Endoscopy: EGD-Colonoscopy Report    Date: 2022    Patient Name: SLOANE REILLY    MRN: [id number]    Account Number:    [id number]    Gender: Female     (age): 1975 (47)    EGD Instrument(s):    GIF H190 1167)([id number])    Colonoscopy Instrument(s):    PCF H190DL 5594)([id number])    Attending/Fellow:    Zac Lemos MD        Procedure Room #:    PROCEDURE ROOM 3        ASA Class:    P2 - 2022 11:04 AM Zac Lemos MD    History of Present Illness:    Pt with chronic dyspepsia and GERD and CRC screening    Administered Medications:    As per Anesthesiology Record    EGD Indications:    Dyspepsia: 536.8 - R10.13    Gastroesophageal reflux disease: 530.81 - K21.9    Colonoscopy Indications:    Screening Colonoscopy: V76.51 - Z12.11    General Procedure:    The procedure, indications, preparation and potential complications were  explained to the patient, who indicated understanding and signed the  corresponding consent forms. MAC was administered by anesthesiologist.  Continuous pulse oximetry and blood pressure monitoring were used throughout the  procedure. Supplemental oxygen was used. Withdrawal time was 6 minutes.    EGD    EGD Procedure:    Patient was placed in left lateral decubitus position. The endoscope was  introduced through mouth and advanced under direct visualization until second  part of the duodenum reached. The Z-line was noted at 40 centimeters. Patient  tolerance to the procedure was good. The procedure was not difficult. Blood loss  was none.    EGD Findings:    Esophagus Mucosa Normal mucosa was noted in the whole esophagus.    Stomach Mucosa Normal mucosa was noted in the whole stomach. Biopsies were  obtained to evaluate for H.Pylori infection.Multiple cold forceps biopsies were  performed for histology.    Normal mucosa and Normal mucosa was noted in the whole stomach. No hiatal  hernia.    Duodenum Mucosa Normal mucosa was noted in the whole duodenum.    EGD Impressions:    Normal mucosa in the whole examined duodenum.    Normal mucosa in the antrum and whole stomach. (Biopsy).    Normal mucosa and Normal mucosa in the whole stomach, cardia and fundus.    Normal mucosa in the whole esophagus.    EGD Limitations/Complications:    There were no apparent limitations or complications    Colonoscopy    Colonoscopy Procedure:    This is a average risk patient undergoing screening colonoscopy. Prior  colonoscopy was performed never ago. The quality of preparation was 6-9 on the  BBP scale/adequate. Patient was placed in left lateral decubitus position. The  colonoscope was introduced through rectum and advanced under direct  visualization until cecum reached at a distance of 100. Visualization of None  was poor. The appendiceal orifice and the ileocecal valve were identified. The  terminal ileum was identified. Retroflexion was not performed in the rectum for  this patient. Patient tolerance to the procedure was excellent. The procedure  was not difficult. Digital exam was normal with the following finding(s): Normal  colonoscopy to the cecum..    Paulding Bowel Preparation Score:    Using the Paulding Bowel Preparation Score, each segment of the colon was graded  as follows:    Left Colon: Good, 2 Transverse Colon: Good, 2 Right Colon: Good, 2    Colonoscopy Findings:    Mucosa Normal mucosa was noted in the whole colon. There were no AVMs, masses,  evidence of colitis or other abnormalities seen. Retroflexion of the scope in  the rectum revealed no abnormalities.    Normal mucosa was noted in the whole colon. There were no AVMs, masses, evidence  of colitis or other abnormalities seen. Retroflexion of the scope in the rectum  revealed no abnormalities.    Colonoscopy Impressions:    Normal mucosa in the whole colon.    Normal mucosa in the whole colon.        Colonoscopy Limitations/Complications:    There were no apparent limitations or complications    Plan:    Await pathology results.    Colonoscopy in 10 years, or sooner if clinically indicated.    Follow-up office visit in 6-8 weeks    Additional Notes:    Normal EGD and Colonoscopy.    Attending Statement:    I was present and participated during the entire procedure, including non-key  portions.    Zac Lemos MD    Version 1, Electronically signed on 2022 11:34:51 AM by Zac Lemos MD.   [facility] Accession Number : [id number] Patient: SLOANE REILLY   Accession: [id number]  Collected Date/Time: 2022 17:00 EDT Received Date/Time: 2022 09:01 EDT  Surgical Pathology Report - Auth (Verified)  Specimen(s) Submitted 1 Antrum biopsy 2 Body biopsy  Final Diagnosis  1. Stomach, antrum , biopsy - Mild chronic inactive gastritis  - No Helicobacter pylori identified (immunostain)  2. Stomach, body , biopsy - Oxyntic mucosa , unremarkable - No Helicobacter pylori identified (immunostain) Verified by: Nilo Duncan M.D. (Electronic Signature) Reported on: 22 18:35 EDT, [facility], [address] [place] [id number].  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

## 2024-08-05 NOTE — PHYSICAL EXAM

## 2024-08-06 ENCOUNTER — OUTPATIENT (OUTPATIENT)
Dept: OUTPATIENT SERVICES | Facility: HOSPITAL | Age: 49
LOS: 1 days | End: 2024-08-06
Payer: COMMERCIAL

## 2024-08-06 DIAGNOSIS — Z98.84 BARIATRIC SURGERY STATUS: ICD-10-CM

## 2024-08-06 PROCEDURE — 74240 X-RAY XM UPR GI TRC 1CNTRST: CPT | Mod: 26

## 2024-08-06 PROCEDURE — 74240 X-RAY XM UPR GI TRC 1CNTRST: CPT

## 2024-08-08 LAB — SURGICAL PATHOLOGY STUDY: SIGNIFICANT CHANGE UP

## 2024-09-06 ENCOUNTER — APPOINTMENT (OUTPATIENT)
Dept: BARIATRICS | Facility: CLINIC | Age: 49
End: 2024-09-06

## 2024-09-06 ENCOUNTER — APPOINTMENT (OUTPATIENT)
Dept: SURGERY | Facility: CLINIC | Age: 49
End: 2024-09-06
Payer: COMMERCIAL

## 2024-09-06 VITALS
DIASTOLIC BLOOD PRESSURE: 77 MMHG | WEIGHT: 192 LBS | TEMPERATURE: 97.8 F | BODY MASS INDEX: 32.78 KG/M2 | HEIGHT: 64 IN | SYSTOLIC BLOOD PRESSURE: 114 MMHG | RESPIRATION RATE: 16 BRPM | OXYGEN SATURATION: 99 % | HEART RATE: 79 BPM

## 2024-09-06 DIAGNOSIS — Z98.84 BARIATRIC SURGERY STATUS: ICD-10-CM

## 2024-09-06 PROCEDURE — 97802 MEDICAL NUTRITION INDIV IN: CPT

## 2024-09-06 PROCEDURE — 99214 OFFICE O/P EST MOD 30 MIN: CPT

## 2024-09-06 NOTE — ASSESSMENT
[FreeTextEntry1] : Ms. REILLY is a 49 year old woman s/p sleeve gastrectomy with moderate/severe reflux. We discussed the options of robotic gastrojejunostomy and nonoperative management. The risks/benefits and alternatives were discussed at length and all questions were answered. The patient appears to understand and wishes to proceed with robotic gastrojejunostomy.

## 2024-09-06 NOTE — PLAN
[FreeTextEntry1] : Plan: Proceed with robotic gastrojejunostomy possible small hiatal hernia repair  2 week liquid diet reviewed with patient. Discussed with patient what to expect pre and post surgery. Patient verbalizes understanding and denies any further questions at this time.

## 2024-09-06 NOTE — PHYSICAL EXAM
[Normal Thyroid] : the thyroid was normal [No Rash or Lesion] : No rash or lesion [Alert] : alert [Oriented to Person] : oriented to person [Oriented to Place] : oriented to place [Oriented to Time] : oriented to time [Calm] : calm [JVD] : no jugular venous distention  [de-identified] :  No acute distress [de-identified] :  No respiratory distress [de-identified] :  Regular rate [de-identified] : soft, nontender. no rebound or guarding. [de-identified] :  normal range of motion

## 2024-09-06 NOTE — HISTORY OF PRESENT ILLNESS
[de-identified] : Ms. REILLY is a 49 year old woman s/p sleeve gastrectomy with moderate/severe reflux who returns today after upper GI x-ray and upper endoscopy completed. Patient remains on PPI therapy with modest relief - she suffers from severe unrelenting reflux with frequent regurgitation if she fails to take daily PPI. Denies aspiration pneumonia. Denies epigastric pain. Today, the patient denies pain. Denies fever/chills/nausea/emesis, denies changes in bowel or bladder habits. Tolerating diet, reports normal bowel movements.  Upper GI xray and EGD reviewed - sleeve anatomy noted. normal mucosa noted on EGD as patient was on daily PPI for procedure  2022 CT reviewed - small hiatal hernia appreciated  We discussed the pathophysiology of hiatal hernia and the indication for repair. We discussed that, if a hiatal hernia is appreciated at time of procedure, it would be repaired. The patient understands and agrees.

## 2024-10-17 ENCOUNTER — APPOINTMENT (OUTPATIENT)
Dept: SURGERY | Facility: CLINIC | Age: 49
End: 2024-10-17
Payer: COMMERCIAL

## 2024-10-17 VITALS
HEART RATE: 74 BPM | TEMPERATURE: 98.2 F | SYSTOLIC BLOOD PRESSURE: 124 MMHG | WEIGHT: 192 LBS | OXYGEN SATURATION: 97 % | BODY MASS INDEX: 32.78 KG/M2 | DIASTOLIC BLOOD PRESSURE: 81 MMHG | RESPIRATION RATE: 16 BRPM | HEIGHT: 64 IN

## 2024-10-17 PROCEDURE — 99214 OFFICE O/P EST MOD 30 MIN: CPT

## 2024-10-31 ENCOUNTER — OUTPATIENT (OUTPATIENT)
Dept: OUTPATIENT SERVICES | Facility: HOSPITAL | Age: 49
LOS: 1 days | End: 2024-10-31
Payer: COMMERCIAL

## 2024-10-31 VITALS
RESPIRATION RATE: 18 BRPM | HEIGHT: 64 IN | HEART RATE: 81 BPM | OXYGEN SATURATION: 98 % | DIASTOLIC BLOOD PRESSURE: 64 MMHG | TEMPERATURE: 97 F | SYSTOLIC BLOOD PRESSURE: 108 MMHG | WEIGHT: 194.67 LBS

## 2024-10-31 DIAGNOSIS — Z29.9 ENCOUNTER FOR PROPHYLACTIC MEASURES, UNSPECIFIED: ICD-10-CM

## 2024-10-31 DIAGNOSIS — K44.9 DIAPHRAGMATIC HERNIA WITHOUT OBSTRUCTION OR GANGRENE: ICD-10-CM

## 2024-10-31 DIAGNOSIS — I10 ESSENTIAL (PRIMARY) HYPERTENSION: ICD-10-CM

## 2024-10-31 DIAGNOSIS — K21.9 GASTRO-ESOPHAGEAL REFLUX DISEASE WITHOUT ESOPHAGITIS: ICD-10-CM

## 2024-10-31 DIAGNOSIS — Z98.84 BARIATRIC SURGERY STATUS: Chronic | ICD-10-CM

## 2024-10-31 DIAGNOSIS — Z01.818 ENCOUNTER FOR OTHER PREPROCEDURAL EXAMINATION: ICD-10-CM

## 2024-10-31 LAB
A1C WITH ESTIMATED AVERAGE GLUCOSE RESULT: 6 % — HIGH (ref 4–5.6)
ALBUMIN SERPL ELPH-MCNC: 4.1 G/DL — SIGNIFICANT CHANGE UP (ref 3.3–5.2)
ALP SERPL-CCNC: 120 U/L — SIGNIFICANT CHANGE UP (ref 40–120)
ALT FLD-CCNC: 19 U/L — SIGNIFICANT CHANGE UP
ANION GAP SERPL CALC-SCNC: 14 MMOL/L — SIGNIFICANT CHANGE UP (ref 5–17)
APTT BLD: 37.1 SEC — HIGH (ref 24.5–35.6)
AST SERPL-CCNC: 21 U/L — SIGNIFICANT CHANGE UP
BASOPHILS # BLD AUTO: 0.03 K/UL — SIGNIFICANT CHANGE UP (ref 0–0.2)
BASOPHILS NFR BLD AUTO: 0.3 % — SIGNIFICANT CHANGE UP (ref 0–2)
BILIRUB SERPL-MCNC: 0.2 MG/DL — LOW (ref 0.4–2)
BLD GP AB SCN SERPL QL: SIGNIFICANT CHANGE UP
BUN SERPL-MCNC: 20.7 MG/DL — HIGH (ref 8–20)
CALCIUM SERPL-MCNC: 9.5 MG/DL — SIGNIFICANT CHANGE UP (ref 8.4–10.5)
CHLORIDE SERPL-SCNC: 103 MMOL/L — SIGNIFICANT CHANGE UP (ref 96–108)
CO2 SERPL-SCNC: 24 MMOL/L — SIGNIFICANT CHANGE UP (ref 22–29)
CREAT SERPL-MCNC: 0.87 MG/DL — SIGNIFICANT CHANGE UP (ref 0.5–1.3)
EGFR: 82 ML/MIN/1.73M2 — SIGNIFICANT CHANGE UP
EOSINOPHIL # BLD AUTO: 0.1 K/UL — SIGNIFICANT CHANGE UP (ref 0–0.5)
EOSINOPHIL NFR BLD AUTO: 1.1 % — SIGNIFICANT CHANGE UP (ref 0–6)
ESTIMATED AVERAGE GLUCOSE: 126 MG/DL — HIGH (ref 68–114)
GLUCOSE SERPL-MCNC: 95 MG/DL — SIGNIFICANT CHANGE UP (ref 70–99)
HCT VFR BLD CALC: 40.2 % — SIGNIFICANT CHANGE UP (ref 34.5–45)
HGB BLD-MCNC: 12.9 G/DL — SIGNIFICANT CHANGE UP (ref 11.5–15.5)
IMM GRANULOCYTES NFR BLD AUTO: 0.1 % — SIGNIFICANT CHANGE UP (ref 0–0.9)
INR BLD: 1.02 RATIO — SIGNIFICANT CHANGE UP (ref 0.85–1.16)
LYMPHOCYTES # BLD AUTO: 2.77 K/UL — SIGNIFICANT CHANGE UP (ref 1–3.3)
LYMPHOCYTES # BLD AUTO: 31.1 % — SIGNIFICANT CHANGE UP (ref 13–44)
MAGNESIUM SERPL-MCNC: 1.9 MG/DL — SIGNIFICANT CHANGE UP (ref 1.6–2.6)
MCHC RBC-ENTMCNC: 27.1 PG — SIGNIFICANT CHANGE UP (ref 27–34)
MCHC RBC-ENTMCNC: 32.1 G/DL — SIGNIFICANT CHANGE UP (ref 32–36)
MCV RBC AUTO: 84.5 FL — SIGNIFICANT CHANGE UP (ref 80–100)
MONOCYTES # BLD AUTO: 0.49 K/UL — SIGNIFICANT CHANGE UP (ref 0–0.9)
MONOCYTES NFR BLD AUTO: 5.5 % — SIGNIFICANT CHANGE UP (ref 2–14)
NEUTROPHILS # BLD AUTO: 5.51 K/UL — SIGNIFICANT CHANGE UP (ref 1.8–7.4)
NEUTROPHILS NFR BLD AUTO: 61.9 % — SIGNIFICANT CHANGE UP (ref 43–77)
PHOSPHATE SERPL-MCNC: 4 MG/DL — SIGNIFICANT CHANGE UP (ref 2.4–4.7)
PLATELET # BLD AUTO: 350 K/UL — SIGNIFICANT CHANGE UP (ref 150–400)
POTASSIUM SERPL-MCNC: 4.3 MMOL/L — SIGNIFICANT CHANGE UP (ref 3.5–5.3)
POTASSIUM SERPL-SCNC: 4.3 MMOL/L — SIGNIFICANT CHANGE UP (ref 3.5–5.3)
PROT SERPL-MCNC: 7.4 G/DL — SIGNIFICANT CHANGE UP (ref 6.6–8.7)
PROTHROM AB SERPL-ACNC: 11.8 SEC — SIGNIFICANT CHANGE UP (ref 9.9–13.4)
RBC # BLD: 4.76 M/UL — SIGNIFICANT CHANGE UP (ref 3.8–5.2)
RBC # FLD: 15.9 % — HIGH (ref 10.3–14.5)
SODIUM SERPL-SCNC: 141 MMOL/L — SIGNIFICANT CHANGE UP (ref 135–145)
WBC # BLD: 8.91 K/UL — SIGNIFICANT CHANGE UP (ref 3.8–10.5)
WBC # FLD AUTO: 8.91 K/UL — SIGNIFICANT CHANGE UP (ref 3.8–10.5)

## 2024-10-31 PROCEDURE — 83735 ASSAY OF MAGNESIUM: CPT

## 2024-10-31 PROCEDURE — 36415 COLL VENOUS BLD VENIPUNCTURE: CPT

## 2024-10-31 PROCEDURE — 86900 BLOOD TYPING SEROLOGIC ABO: CPT

## 2024-10-31 PROCEDURE — 86901 BLOOD TYPING SEROLOGIC RH(D): CPT

## 2024-10-31 PROCEDURE — 85730 THROMBOPLASTIN TIME PARTIAL: CPT

## 2024-10-31 PROCEDURE — 85610 PROTHROMBIN TIME: CPT

## 2024-10-31 PROCEDURE — 84100 ASSAY OF PHOSPHORUS: CPT

## 2024-10-31 PROCEDURE — 93005 ELECTROCARDIOGRAM TRACING: CPT

## 2024-10-31 PROCEDURE — 80053 COMPREHEN METABOLIC PANEL: CPT

## 2024-10-31 PROCEDURE — 93010 ELECTROCARDIOGRAM REPORT: CPT

## 2024-10-31 PROCEDURE — G0463: CPT

## 2024-10-31 PROCEDURE — 85025 COMPLETE CBC W/AUTO DIFF WBC: CPT

## 2024-10-31 PROCEDURE — 86850 RBC ANTIBODY SCREEN: CPT

## 2024-10-31 PROCEDURE — 83036 HEMOGLOBIN GLYCOSYLATED A1C: CPT

## 2024-10-31 RX ORDER — BUPIVACAINE 13.3 MG/ML
20 INJECTION, SUSPENSION, LIPOSOMAL INFILTRATION ONCE
Refills: 0 | Status: DISCONTINUED | OUTPATIENT
Start: 2024-11-20 | End: 2024-11-21

## 2024-10-31 RX ORDER — CEFAZOLIN SODIUM 10 G
2000 VIAL (EA) INJECTION ONCE
Refills: 0 | Status: COMPLETED | OUTPATIENT
Start: 2024-11-20 | End: 2024-11-20

## 2024-10-31 NOTE — H&P PST ADULT - HISTORY OF PRESENT ILLNESS
Pt is a 49 years old female seen today pre-op for Robotic gastrojejunostomy with possible small hiatal hernia repair for  Pt is a 49 years old female seen today pre-op for Robotic gastrojejunostomy with possible small hiatal hernia repair. Pt reports  progressively intermittent acid reflux , nausea and emesis. Pt denies dysphagia, Fever/chills, denies bowel and bladder habits , Pt hx of laparoscopic sleeve gastrectomy about six years ago.     Pt is a 49 years old female, [place] speaking, seen today pre-op for Robotic gastrojejunostomy with possible small hiatal hernia repair. Pt reports intermittent acid reflux, nausea and emesis now progressively worsening.  Pt denies dysphagia,, denies Fever/chills, denies any change in bowel and bladder habits , Pt hx of laparoscopic sleeve gastrectomy about six years ago, medical hx includes GERD, HTN, TMJ, Migraines, Obese BMI 33.4, TARIK. . Pt scheduled for this surgery on 11/20/24 with Dr. Rolle

## 2024-10-31 NOTE — H&P PST ADULT - ASSESSMENT
Pt is a 49 years old female, Portuguese speaking, seen today pre-op for Robotic gastrojejunostomy with possible small hiatal hernia repair. Pt reports intermittent acid reflux, nausea and emesis now progressively worsening.  Pt denies dysphagia,, denies Fever/chills, denies any change in bowel and bladder habits , Pt hx of laparoscopic sleeve gastrectomy about six years ago, medical hx includes GERD, HTN, TMJ, Migraines, Obese BMI 33.4, TARIK. . Pt scheduled for this surgery on 24 with Dr. Rolle . Surgery protocol reviewed with Pt today. Pt to follow up with PCP for evaluation and clearance prior to this surgery. Pt provided instruction on ERP protocol, incentive spirometer, prior to this surgery, email sent to clinical pharmacist  regarding Pt surgery date and list of medication.   CAPRINI VTE 2.0 SCORE [CLOT updated 2019]    AGE RELATED RISK FACTORS                                                       MOBILITY RELATED FACTORS  [x ] Age 41-60 years                                            (1 Point)                    [ ] Bed rest                                                        (1 Point)  [ ] Age: 61-74 years                                           (2 Points)                  [ ] Plaster cast                                                   (2 Points)  [ ] Age= 75 years                                              (3 Points)                    [ ] Bed bound for more than 72 hours                 (2 Points)    DISEASE RELATED RISK FACTORS                                               GENDER SPECIFIC FACTORS  [ ] Edema in the lower extremities                       (1 Point)              [ ] Pregnancy                                                     (1 Point)  [ ] Varicose veins                                               (1 Point)                     [ ] Post-partum < 6 weeks                                   (1 Point)             [x ] BMI > 25 Kg/m2                                            (1 Point)                     [ ] Hormonal therapy  or oral contraception          (1 Point)                 [ ] Sepsis (in the previous month)                        (1 Point)               [ ] History of pregnancy complications                 (1 point)  [ ] Pneumonia or serious lung disease                                               [ ] Unexplained or recurrent                     (1 Point)           (in the previous month)                               (1 Point)  [ ] Abnormal pulmonary function test                     (1 Point)                 SURGERY RELATED RISK FACTORS  [ ] Acute myocardial infarction                              (1 Point)               [ ]  Section                                             (1 Point)  [ ] Congestive heart failure (in the previous month)  (1 Point)      [ ] Minor surgery                                                  (1 Point)   [ ] Inflammatory bowel disease                             (1 Point)               [ ] Arthroscopic surgery                                        (2 Points)  [ ] Central venous access                                      (2 Points)                [x ] General surgery lasting more than 45 minutes (2 points)  [ ] Malignancy- Present or previous                   (2 Points)                [ ] Elective arthroplasty                                         (5 points)    [ ] Stroke (in the previous month)                          (5 Points)                                                                                                                                                           HEMATOLOGY RELATED FACTORS                                                 TRAUMA RELATED RISK FACTORS  [ ] Prior episodes of VTE                                     (3 Points)                [ ] Fracture of the hip, pelvis, or leg                       (5 Points)  [ ] Positive family history for VTE                         (3 Points)             [ ] Acute spinal cord injury (in the previous month)  (5 Points)  [ ] Prothrombin 63400 A                                     (3 Points)               [ ] Paralysis  (less than 1 month)                             (5 Points)  [ ] Factor V Leiden                                             (3 Points)                  [ ] Multiple Trauma within 1 month                        (5 Points)  [ ] Lupus anticoagulants                                     (3 Points)                                                           [ ] Anticardiolipin antibodies                               (3 Points)                                                       [ ] High homocysteine in the blood                      (3 Points)                                             [ ] Other congenital or acquired thrombophilia      (3 Points)                                                [ ] Heparin induced thrombocytopenia                  (3 Points)                                     Total Score [      4    ]   OPIOID RISK TOOL    LOU EACH BOX THAT APPLIES AND ADD TOTALS AT THE END    FAMILY HISTORY OF SUBSTANCE ABUSE                 FEMALE         MALE                                                Alcohol                             [  ]1 pt          [  ]3pts                                               Illegal Durgs                     [  ]2 pts        [  ]3pts                                               Rx Drugs                           [  ]4 pts        [  ]4 pts    PERSONAL HISTORY OF SUBSTANCE ABUSE                                                                                          Alcohol                             [  ]3 pts       [  ]3 pts                                               Illegal Drugs                     [  ]4 pts        [  ]4 pts                                               Rx Drugs                           [  ]5 pts        [  ]5 pts    AGE BETWEEN 16-45 YEARS                                      [  ]1 pt         [  ]1 pt    HISTORY OF PREADOLESCENT   SEXUAL ABUSE                                                             [  ]3 pts        [  ]0pts    PSYCHOLOGICAL DISEASE                     ADD, OCD, Bipolar, Schizophrenia        [  ]2 pts         [  ]2 pts                      Depression                                               [  ]1 pt           [  ]1 pt           SCORING TOTAL   (add numbers and type here)              (**0*)                                     A score of 3 or lower indicated LOW risk for future opioid abuse  A score of 4 to 7 indicated moderate risk for future opioid abuse  A score of 8 or higher indicates a high risk for opioid abuse

## 2024-10-31 NOTE — H&P PST ADULT - ATTENDING COMMENTS
Plan for robotic gastrojejunostomy / conversion sleeve to gastric bypass, hiatal hernia repair  Risks/benefits discussed with patient. Patient understands, agrees, and wishes to proceed. All questions answered.

## 2024-10-31 NOTE — H&P PST ADULT - NSICDXPASTMEDICALHX_GEN_ALL_CORE_FT
PAST MEDICAL HISTORY:  Hiatal hernia with GERD without esophagitis     HTN (hypertension)      PAST MEDICAL HISTORY:  Hiatal hernia with GERD without esophagitis     HTN (hypertension)     Obese     TARIK (obstructive sleep apnea)

## 2024-10-31 NOTE — H&P PST ADULT - OTHER CARE PROVIDERS
Dr. Moreau [place] [phone] Dr. Prieto May [phone], cardiologist  dr. Crow Lala  [phone]

## 2024-10-31 NOTE — H&P PST ADULT - NSICDXFAMILYHX_GEN_ALL_CORE_FT
FAMILY HISTORY:  Father  Still living? No  FH: diabetes mellitus, Age at diagnosis: Age Unknown  FHx: hypertension, Age at diagnosis: Age Unknown    Mother  Still living? Yes, Estimated age: Age Unknown  FHx: hypertension, Age at diagnosis: Age Unknown

## 2024-11-08 PROBLEM — E66.9 OBESITY, UNSPECIFIED: Chronic | Status: ACTIVE | Noted: 2024-10-31

## 2024-11-08 PROBLEM — G47.33 OBSTRUCTIVE SLEEP APNEA (ADULT) (PEDIATRIC): Chronic | Status: ACTIVE | Noted: 2024-10-31

## 2024-11-08 PROBLEM — K44.9 DIAPHRAGMATIC HERNIA WITHOUT OBSTRUCTION OR GANGRENE: Chronic | Status: ACTIVE | Noted: 2024-10-31

## 2024-11-13 RX ORDER — ONDANSETRON HYDROCHLORIDE 2 MG/ML
1 INJECTION, SOLUTION INTRAMUSCULAR; INTRAVENOUS
Refills: 0 | DISCHARGE

## 2024-11-13 RX ORDER — OMEPRAZOLE 10 MG
0 CAPSULE,DELAYED RELEASE (ENTERIC COATED) ORAL
Refills: 0 | DISCHARGE

## 2024-11-13 RX ORDER — IBUPROFEN 200 MG
1 TABLET ORAL
Refills: 0 | DISCHARGE

## 2024-11-13 NOTE — PHARMACOTHERAPY INTERVENTION NOTE - COMMENTS
Sleeve gastrectomy scheduled for 11/20/2024.  Patient medication reconciliation completed. Patient currently taking:   HOME MEDICATIONS:  acetaminophen 500 mg oral tablet: 2 tab(s) orally 3 times a day (13 Nov 2024 10:59)  cetirizine 10 mg oral tablet: 1 tab(s) orally (13 Nov 2024 11:02)  famotidine 40 mg oral tablet: 1 tab(s) orally once a day as needed for  heartburn (13 Nov 2024 10:59)  lisinopril 5 mg oral tablet: 1 tab(s) orally once a day (13 Nov 2024 11:02)  omeprazole 40 mg oral delayed release capsule: 1 cap(s) orally 2 times a day (13 Nov 2024 11:00)  ondansetron 4 mg oral tablet: 1 tab(s) orally once a day as needed for  nausea (13 Nov 2024 11:02)  SUMAtriptan 100 mg oral tablet: 1 tab(s) orally once a day as needed for migraine (13 Nov 2024 11:01)  topiramate 25 mg oral tablet: 1 tab(s) orally once a day (at bedtime) (13 Nov 2024 11:02)        Patient was informed to take daily multivitamins post surgically. Patient reeducated on NSAID avoidance (ibuprofen, ASA, naproxen, aleve) as they increased risk of GI bleeding; may use APAP for mild pain otherwise contact prescriber for consult. Patient was informed on indications and directions for administration for hyoscyamine SL, acetaminophen liquid, ondansetron ODT, and omeprazole DRKaveh Patient was instructed to take the medications as follows:    Patient stopped taking ibuprofen for back pain and understands to avoid NSAIDs. Patient currently takes omeprazole 40 mg BID and ondansetron as needed, so depending on last fill dates insurance might not cover new prescriptions for them. Continue all home meds. Sleeve gastrectomy scheduled for 11/20/2024.  services utilized, ID #[id number].  Patient medication reconciliation completed. Patient currently taking:   HOME MEDICATIONS:  acetaminophen 500 mg oral tablet: 2 tab(s) orally 3 times a day (13 Nov 2024 10:59)  cetirizine 10 mg oral tablet: 1 tab(s) orally (13 Nov 2024 11:02)  famotidine 40 mg oral tablet: 1 tab(s) orally once a day as needed for  heartburn (13 Nov 2024 10:59)  lisinopril 5 mg oral tablet: 1 tab(s) orally once a day (13 Nov 2024 11:02)  omeprazole 40 mg oral delayed release capsule: 1 cap(s) orally 2 times a day (13 Nov 2024 11:00)  ondansetron 4 mg oral tablet: 1 tab(s) orally once a day as needed for  nausea (13 Nov 2024 11:02)  SUMAtriptan 100 mg oral tablet: 1 tab(s) orally once a day as needed for migraine (13 Nov 2024 11:01)  topiramate 25 mg oral tablet: 1 tab(s) orally once a day (at bedtime) (13 Nov 2024 11:02)      Patient was informed to take daily multivitamins post surgically. Patient reeducated on NSAID avoidance (ibuprofen, ASA, naproxen, aleve) as they increased risk of GI bleeding; may use APAP for mild pain otherwise contact prescriber for consult. Patient was informed on indications and directions for administration for hyoscyamine SL, acetaminophen liquid, ondansetron ODT, and omeprazole . Patient was instructed to take the medications as follows:    Patient stopped taking ibuprofen for back pain and understands to avoid NSAIDs. Patient currently takes omeprazole 40 mg BID and ondansetron as needed, so depending on last fill dates insurance might not cover new prescriptions for them. Continue all home meds.

## 2024-11-14 ENCOUNTER — RX ONLY (RX ONLY)
Age: 49
End: 2024-11-14

## 2024-11-14 ENCOUNTER — OFFICE (OUTPATIENT)
Dept: URBAN - METROPOLITAN AREA CLINIC 63 | Facility: CLINIC | Age: 49
Setting detail: OPHTHALMOLOGY
End: 2024-11-14
Payer: COMMERCIAL

## 2024-11-14 DIAGNOSIS — H43.811: ICD-10-CM

## 2024-11-14 DIAGNOSIS — H52.4: ICD-10-CM

## 2024-11-14 DIAGNOSIS — H04.123: ICD-10-CM

## 2024-11-14 DIAGNOSIS — H35.033: ICD-10-CM

## 2024-11-14 DIAGNOSIS — H40.013: ICD-10-CM

## 2024-11-14 DIAGNOSIS — H25.13: ICD-10-CM

## 2024-11-14 PROCEDURE — 92133 CPTRZD OPH DX IMG PST SGM ON: CPT | Performed by: INTERNAL MEDICINE

## 2024-11-14 PROCEDURE — 92014 COMPRE OPH EXAM EST PT 1/>: CPT | Performed by: INTERNAL MEDICINE

## 2024-11-14 PROCEDURE — 76514 ECHO EXAM OF EYE THICKNESS: CPT | Performed by: INTERNAL MEDICINE

## 2024-11-14 PROCEDURE — 92083 EXTENDED VISUAL FIELD XM: CPT | Performed by: INTERNAL MEDICINE

## 2024-11-14 ASSESSMENT — REFRACTION_CURRENTRX
OS_OVR_VA: 20/
OD_OVR_VA: 20/
OD_CYLINDER: -0.25
OS_AXIS: 088
OD_VPRISM_DIRECTION: SV
OD_SPHERE: PLANO
OS_ADD: +1.75
OS_VPRISM_DIRECTION: SV
OD_AXIS: 131
OS_SPHERE: +0.50
OD_ADD: +1.75
OS_CYLINDER: -0.75

## 2024-11-14 ASSESSMENT — REFRACTION_MANIFEST
OD_CYLINDER: -0.50
OD_ADD: +1.75
OD_AXIS: 125
OD_VA1: 20/20
OS_ADD: +1.75
OD_SPHERE: +0.50
OS_SPHERE: +0.50
OS_CYLINDER: -0.50
OS_AXIS: 095
OS_VA1: 20/20

## 2024-11-14 ASSESSMENT — TONOMETRY
OS_IOP_MMHG: 14
OD_IOP_MMHG: 16
OS_IOP_MMHG: 17
OD_IOP_MMHG: 15

## 2024-11-14 ASSESSMENT — SUPERFICIAL PUNCTATE KERATITIS (SPK)
OD_SPK: T
OS_SPK: T

## 2024-11-14 ASSESSMENT — PACHYMETRY
OD_CT_CORRECTION: -5
OS_CT_CORRECTION: -3
OD_CT_UM: 610
OS_CT_UM: 580

## 2024-11-14 ASSESSMENT — KERATOMETRY
OS_K2POWER_DIOPTERS: 46.00
OS_AXISANGLE_DEGREES: 082
OD_K2POWER_DIOPTERS: 45.75
OD_K1POWER_DIOPTERS: 44.75
OD_AXISANGLE_DEGREES: 074
OS_K1POWER_DIOPTERS: 45.75

## 2024-11-14 ASSESSMENT — VISUAL ACUITY
OS_BCVA: 20/20
OD_BCVA: 20/20

## 2024-11-14 ASSESSMENT — CONFRONTATIONAL VISUAL FIELD TEST (CVF)
OD_FINDINGS: FULL
OS_FINDINGS: FULL

## 2024-11-14 ASSESSMENT — REFRACTION_AUTOREFRACTION
OD_SPHERE: +0.75
OS_AXIS: 093
OD_AXIS: 107
OD_CYLINDER: -0.50
OS_CYLINDER: -0.75
OS_SPHERE: +1.25

## 2024-11-14 ASSESSMENT — TEAR BREAK UP TIME (TBUT)
OS_TBUT: T
OD_TBUT: T

## 2024-11-20 ENCOUNTER — APPOINTMENT (OUTPATIENT)
Dept: SURGERY | Facility: HOSPITAL | Age: 49
End: 2024-11-20

## 2024-11-20 ENCOUNTER — INPATIENT (INPATIENT)
Facility: HOSPITAL | Age: 49
LOS: 0 days | Discharge: HOME | DRG: 392 | End: 2024-11-21
Attending: SURGERY | Admitting: SURGERY
Payer: COMMERCIAL

## 2024-11-20 ENCOUNTER — TRANSCRIPTION ENCOUNTER (OUTPATIENT)
Age: 49
End: 2024-11-20

## 2024-11-20 VITALS
RESPIRATION RATE: 16 BRPM | OXYGEN SATURATION: 99 % | WEIGHT: 188.27 LBS | DIASTOLIC BLOOD PRESSURE: 88 MMHG | TEMPERATURE: 97 F | HEART RATE: 72 BPM | HEIGHT: 64.02 IN | SYSTOLIC BLOOD PRESSURE: 109 MMHG

## 2024-11-20 DIAGNOSIS — Z98.84 BARIATRIC SURGERY STATUS: Chronic | ICD-10-CM

## 2024-11-20 DIAGNOSIS — K21.9 GASTRO-ESOPHAGEAL REFLUX DISEASE WITHOUT ESOPHAGITIS: ICD-10-CM

## 2024-11-20 LAB
ABO RH CONFIRMATION: SIGNIFICANT CHANGE UP
ANION GAP SERPL CALC-SCNC: 15 MMOL/L — SIGNIFICANT CHANGE UP (ref 5–17)
BASOPHILS # BLD AUTO: 0.02 K/UL — SIGNIFICANT CHANGE UP (ref 0–0.2)
BASOPHILS NFR BLD AUTO: 0.2 % — SIGNIFICANT CHANGE UP (ref 0–2)
BUN SERPL-MCNC: 16.7 MG/DL — SIGNIFICANT CHANGE UP (ref 8–20)
CALCIUM SERPL-MCNC: 8.3 MG/DL — LOW (ref 8.4–10.5)
CHLORIDE SERPL-SCNC: 102 MMOL/L — SIGNIFICANT CHANGE UP (ref 96–108)
CO2 SERPL-SCNC: 21 MMOL/L — LOW (ref 22–29)
CREAT SERPL-MCNC: 0.91 MG/DL — SIGNIFICANT CHANGE UP (ref 0.5–1.3)
EGFR: 78 ML/MIN/1.73M2 — SIGNIFICANT CHANGE UP
EOSINOPHIL # BLD AUTO: 0.01 K/UL — SIGNIFICANT CHANGE UP (ref 0–0.5)
EOSINOPHIL NFR BLD AUTO: 0.1 % — SIGNIFICANT CHANGE UP (ref 0–6)
GLUCOSE SERPL-MCNC: 161 MG/DL — HIGH (ref 70–99)
HCT VFR BLD CALC: 35.7 % — SIGNIFICANT CHANGE UP (ref 34.5–45)
HGB BLD-MCNC: 11.9 G/DL — SIGNIFICANT CHANGE UP (ref 11.5–15.5)
IMM GRANULOCYTES NFR BLD AUTO: 0.5 % — SIGNIFICANT CHANGE UP (ref 0–0.9)
LYMPHOCYTES # BLD AUTO: 0.85 K/UL — LOW (ref 1–3.3)
LYMPHOCYTES # BLD AUTO: 7 % — LOW (ref 13–44)
MCHC RBC-ENTMCNC: 27.8 PG — SIGNIFICANT CHANGE UP (ref 27–34)
MCHC RBC-ENTMCNC: 33.3 G/DL — SIGNIFICANT CHANGE UP (ref 32–36)
MCV RBC AUTO: 83.4 FL — SIGNIFICANT CHANGE UP (ref 80–100)
MONOCYTES # BLD AUTO: 0.22 K/UL — SIGNIFICANT CHANGE UP (ref 0–0.9)
MONOCYTES NFR BLD AUTO: 1.8 % — LOW (ref 2–14)
NEUTROPHILS # BLD AUTO: 11.02 K/UL — HIGH (ref 1.8–7.4)
NEUTROPHILS NFR BLD AUTO: 90.4 % — HIGH (ref 43–77)
PLATELET # BLD AUTO: 270 K/UL — SIGNIFICANT CHANGE UP (ref 150–400)
POTASSIUM SERPL-MCNC: 4.4 MMOL/L — SIGNIFICANT CHANGE UP (ref 3.5–5.3)
POTASSIUM SERPL-SCNC: 4.4 MMOL/L — SIGNIFICANT CHANGE UP (ref 3.5–5.3)
RBC # BLD: 4.28 M/UL — SIGNIFICANT CHANGE UP (ref 3.8–5.2)
RBC # FLD: 15.4 % — HIGH (ref 10.3–14.5)
SODIUM SERPL-SCNC: 138 MMOL/L — SIGNIFICANT CHANGE UP (ref 135–145)
WBC # BLD: 12.18 K/UL — HIGH (ref 3.8–10.5)
WBC # FLD AUTO: 12.18 K/UL — HIGH (ref 3.8–10.5)

## 2024-11-20 PROCEDURE — 43281 LAP PARAESOPHAG HERN REPAIR: CPT

## 2024-11-20 PROCEDURE — 43644 LAP GASTRIC BYPASS/ROUX-EN-Y: CPT

## 2024-11-20 PROCEDURE — 49591 RPR AA HRN 1ST < 3 CM RDC: CPT

## 2024-11-20 PROCEDURE — S2900 ROBOTIC SURGICAL SYSTEM: CPT | Mod: NC

## 2024-11-20 DEVICE — XI STAPLER SUREFORM RELOAD 60 BLUE: Type: IMPLANTABLE DEVICE | Status: FUNCTIONAL

## 2024-11-20 DEVICE — XI STAPLER SUREFORM RELOAD 60 WHITE: Type: IMPLANTABLE DEVICE | Status: FUNCTIONAL

## 2024-11-20 RX ORDER — ACETAMINOPHEN 500MG 500 MG/1
1000 TABLET, COATED ORAL ONCE
Refills: 0 | Status: COMPLETED | OUTPATIENT
Start: 2024-11-21 | End: 2024-11-21

## 2024-11-20 RX ORDER — HEPARIN SODIUM,PORCINE 1000/ML
5000 VIAL (ML) INJECTION EVERY 8 HOURS
Refills: 0 | Status: DISCONTINUED | OUTPATIENT
Start: 2024-11-20 | End: 2024-11-21

## 2024-11-20 RX ORDER — LISINOPRIL 40 MG
1 TABLET ORAL
Refills: 0 | DISCHARGE

## 2024-11-20 RX ORDER — HYOSCYAMINE SULFATE 0.125 MG
0.12 TABLET, SUBLINGUAL SUBLINGUAL EVERY 4 HOURS
Refills: 0 | Status: DISCONTINUED | OUTPATIENT
Start: 2024-11-20 | End: 2024-11-21

## 2024-11-20 RX ORDER — CETIRIZINE HCL 10 MG/1
1 TABLET ORAL
Refills: 0 | DISCHARGE

## 2024-11-20 RX ORDER — ONDANSETRON HYDROCHLORIDE 4 MG/1
4 TABLET, FILM COATED ORAL EVERY 6 HOURS
Refills: 0 | Status: DISCONTINUED | OUTPATIENT
Start: 2024-11-20 | End: 2024-11-21

## 2024-11-20 RX ORDER — LORAZEPAM 2 MG/1
0.5 TABLET ORAL ONCE
Refills: 0 | Status: DISCONTINUED | OUTPATIENT
Start: 2024-11-20 | End: 2024-11-21

## 2024-11-20 RX ORDER — ACETAMINOPHEN 500MG 500 MG/1
975 TABLET, COATED ORAL EVERY 8 HOURS
Refills: 0 | Status: DISCONTINUED | OUTPATIENT
Start: 2024-11-21 | End: 2024-11-21

## 2024-11-20 RX ORDER — OMEPRAZOLE 20 MG/1
1 CAPSULE, DELAYED RELEASE ORAL
Refills: 0 | DISCHARGE

## 2024-11-20 RX ORDER — METOCLOPRAMIDE HYDROCHLORIDE 10 MG/1
10 TABLET ORAL EVERY 6 HOURS
Refills: 0 | Status: DISCONTINUED | OUTPATIENT
Start: 2024-11-20 | End: 2024-11-21

## 2024-11-20 RX ORDER — TOPIRAMATE 25 MG/1
25 TABLET ORAL AT BEDTIME
Refills: 0 | Status: DISCONTINUED | OUTPATIENT
Start: 2024-11-20 | End: 2024-11-21

## 2024-11-20 RX ORDER — FAMOTIDINE 10 MG/ML
1 INJECTION INTRAVENOUS
Refills: 0 | DISCHARGE

## 2024-11-20 RX ORDER — ACETAMINOPHEN 500MG 500 MG/1
1000 TABLET, COATED ORAL ONCE
Refills: 0 | Status: DISCONTINUED | OUTPATIENT
Start: 2024-11-20 | End: 2024-11-21

## 2024-11-20 RX ORDER — SUMATRIPTAN SUCCINATE 6 MG/.5ML
1 INJECTION, SOLUTION SUBCUTANEOUS
Refills: 0 | DISCHARGE

## 2024-11-20 RX ORDER — KETOROLAC TROMETHAMINE 30 MG/ML
15 INJECTION INTRAMUSCULAR; INTRAVENOUS EVERY 6 HOURS
Refills: 0 | Status: DISCONTINUED | OUTPATIENT
Start: 2024-11-20 | End: 2024-11-21

## 2024-11-20 RX ORDER — FENTANYL 12 UG/H
50 PATCH, EXTENDED RELEASE TRANSDERMAL
Refills: 0 | Status: DISCONTINUED | OUTPATIENT
Start: 2024-11-20 | End: 2024-11-20

## 2024-11-20 RX ORDER — LISINOPRIL 20 MG/1
5 TABLET ORAL DAILY
Refills: 0 | Status: DISCONTINUED | OUTPATIENT
Start: 2024-11-20 | End: 2024-11-21

## 2024-11-20 RX ORDER — ONDANSETRON HYDROCHLORIDE 4 MG/1
1 TABLET, FILM COATED ORAL
Refills: 0 | DISCHARGE

## 2024-11-20 RX ORDER — 0.9 % SODIUM CHLORIDE 0.9 %
1000 INTRAVENOUS SOLUTION INTRAVENOUS
Refills: 0 | Status: DISCONTINUED | OUTPATIENT
Start: 2024-11-20 | End: 2024-11-20

## 2024-11-20 RX ORDER — TOPIRAMATE 200 MG/1
1 TABLET ORAL
Refills: 0 | DISCHARGE

## 2024-11-20 RX ORDER — 0.9 % SODIUM CHLORIDE 0.9 %
1000 INTRAVENOUS SOLUTION INTRAVENOUS
Refills: 0 | Status: DISCONTINUED | OUTPATIENT
Start: 2024-11-20 | End: 2024-11-21

## 2024-11-20 RX ORDER — INFLUENZA VIRUS VACCINE 15; 15; 15; 15 UG/.5ML; UG/.5ML; UG/.5ML; UG/.5ML
0.5 SUSPENSION INTRAMUSCULAR ONCE
Refills: 0 | Status: DISCONTINUED | OUTPATIENT
Start: 2024-11-20 | End: 2024-11-21

## 2024-11-20 RX ORDER — PANTOPRAZOLE SODIUM 40 MG/1
40 TABLET, DELAYED RELEASE ORAL EVERY 24 HOURS
Refills: 0 | Status: DISCONTINUED | OUTPATIENT
Start: 2024-11-20 | End: 2024-11-21

## 2024-11-20 RX ORDER — SUMATRIPTAN SUCCINATE 100 MG/1
100 TABLET, FILM COATED ORAL EVERY 24 HOURS
Refills: 0 | Status: DISCONTINUED | OUTPATIENT
Start: 2024-11-20 | End: 2024-11-21

## 2024-11-20 RX ORDER — ACETAMINOPHEN 500MG 500 MG/1
1000 TABLET, COATED ORAL ONCE
Refills: 0 | Status: COMPLETED | OUTPATIENT
Start: 2024-11-20 | End: 2024-11-20

## 2024-11-20 RX ORDER — HYDROMORPHONE HYDROCHLORIDE 2 MG/1
0.5 TABLET ORAL
Refills: 0 | Status: DISCONTINUED | OUTPATIENT
Start: 2024-11-20 | End: 2024-11-20

## 2024-11-20 RX ORDER — ONDANSETRON HYDROCHLORIDE 4 MG/1
4 TABLET, FILM COATED ORAL ONCE
Refills: 0 | Status: DISCONTINUED | OUTPATIENT
Start: 2024-11-20 | End: 2024-11-20

## 2024-11-20 RX ORDER — HEPARIN SODIUM,PORCINE 1000/ML
5000 VIAL (ML) INJECTION ONCE
Refills: 0 | Status: COMPLETED | OUTPATIENT
Start: 2024-11-20 | End: 2024-11-20

## 2024-11-20 RX ADMIN — Medication 5000 UNIT(S): at 18:02

## 2024-11-20 RX ADMIN — KETOROLAC TROMETHAMINE 15 MILLIGRAM(S): 30 INJECTION INTRAMUSCULAR; INTRAVENOUS at 20:23

## 2024-11-20 RX ADMIN — FENTANYL 50 MICROGRAM(S): 12 PATCH, EXTENDED RELEASE TRANSDERMAL at 15:30

## 2024-11-20 RX ADMIN — FENTANYL 50 MICROGRAM(S): 12 PATCH, EXTENDED RELEASE TRANSDERMAL at 15:06

## 2024-11-20 RX ADMIN — Medication 2000 MILLIGRAM(S): at 11:00

## 2024-11-20 RX ADMIN — METOCLOPRAMIDE HYDROCHLORIDE 10 MILLIGRAM(S): 10 TABLET ORAL at 20:35

## 2024-11-20 RX ADMIN — ACETAMINOPHEN 500MG 1000 MILLIGRAM(S): 500 TABLET, COATED ORAL at 22:38

## 2024-11-20 RX ADMIN — HYDROMORPHONE HYDROCHLORIDE 0.5 MILLIGRAM(S): 2 TABLET ORAL at 14:30

## 2024-11-20 RX ADMIN — TOPIRAMATE 25 MILLIGRAM(S): 25 TABLET ORAL at 21:37

## 2024-11-20 RX ADMIN — KETOROLAC TROMETHAMINE 15 MILLIGRAM(S): 30 INJECTION INTRAMUSCULAR; INTRAVENOUS at 21:23

## 2024-11-20 RX ADMIN — ONDANSETRON HYDROCHLORIDE 4 MILLIGRAM(S): 4 TABLET, FILM COATED ORAL at 23:16

## 2024-11-20 RX ADMIN — FENTANYL 50 MICROGRAM(S): 12 PATCH, EXTENDED RELEASE TRANSDERMAL at 16:49

## 2024-11-20 RX ADMIN — Medication 5000 UNIT(S): at 10:33

## 2024-11-20 RX ADMIN — KETOROLAC TROMETHAMINE 15 MILLIGRAM(S): 30 INJECTION INTRAMUSCULAR; INTRAVENOUS at 18:57

## 2024-11-20 RX ADMIN — Medication 0.12 MILLIGRAM(S): at 20:23

## 2024-11-20 RX ADMIN — ACETAMINOPHEN 500MG 400 MILLIGRAM(S): 500 TABLET, COATED ORAL at 21:38

## 2024-11-20 RX ADMIN — KETOROLAC TROMETHAMINE 15 MILLIGRAM(S): 30 INJECTION INTRAMUSCULAR; INTRAVENOUS at 23:16

## 2024-11-20 RX ADMIN — HYDROMORPHONE HYDROCHLORIDE 0.5 MILLIGRAM(S): 2 TABLET ORAL at 14:20

## 2024-11-20 RX ADMIN — ONDANSETRON HYDROCHLORIDE 4 MILLIGRAM(S): 4 TABLET, FILM COATED ORAL at 18:02

## 2024-11-20 RX ADMIN — PANTOPRAZOLE SODIUM 40 MILLIGRAM(S): 40 TABLET, DELAYED RELEASE ORAL at 14:20

## 2024-11-20 RX ADMIN — KETOROLAC TROMETHAMINE 15 MILLIGRAM(S): 30 INJECTION INTRAMUSCULAR; INTRAVENOUS at 18:02

## 2024-11-20 NOTE — DISCHARGE NOTE PROVIDER - HOSPITAL COURSE
On 11/20/24  MS Staton who has a history of GERD, HTN, TMJ, Migraines, Obese BMI 33.4, TARIK underwent Robotic Assisted conversion to gastric bypass hiatal hernia repair and umbilical hernia repair . Bariatric ERAS protocol followed to include preoperative and perioperative use of DVT and SSI prophylaxis as well as multi-modal non-opioid analgesia. Patient tolerated the procedure well  extubated in the operating room then transferred to the PACU in stable condition. Once hemodynamically stable and effective pain control the patient was able to ambulate with assistance. Pt was also able to void within 4 hours following the procedure. The patient was later transferred to a bariatric unit and placed on bedside continuous pulse oximetry. Pain managment included IV multi-modal non-opioid analgesia then transitioned to liquid as needed. The patient tolerated bariatric clear liquid the evening of surgery.    On POD #1 patient remained stable with no acute events overnight, has effective  pain control. Denies nausea and vomiting. Patient is ambulating independently and voiding as expected. The rest of the hospital course was uneventful, patient met Bariatric Surgery D/C criteria and subsequently cleared for discharge home on POD#1. Patient noted to have a Pain score of (1-10)  ,based on Likert pain scale.   NO extended VTE prophylaxis based on [facility] VTE Risk Stratification. The patient will follow a protocol-derived staged meal progression supervised by the dietitian in the outpatient setting. Patient will follow-up with Dr. Rolle in 10-14 days, medical doctor and dietitian in 30 days. [facility] prior to discharge. Written discharge instruction explained and given.    On 11/20/24  MS Staton who has a history of GERD, HTN, TMJ, Migraines, Obese BMI 33.4, TARIK underwent Robotic Assisted conversion to gastric bypass hiatal hernia repair and umbilical hernia repair . Bariatric ERAS protocol followed to include preoperative and perioperative use of DVT and SSI prophylaxis as well as multi-modal non-opioid analgesia. Patient tolerated the procedure well  extubated in the operating room then transferred to the PACU in stable condition. Once hemodynamically stable and effective pain control the patient was able to ambulate with assistance. Pt was also able to void within 4 hours following the procedure. The patient was later transferred to a bariatric unit and placed on bedside continuous pulse oximetry. Pain managment included IV multi-modal non-opioid analgesia then transitioned to liquid as needed. The patient tolerated bariatric clear liquid the evening of surgery.    On POD #1 patient remained stable with no acute events overnight, has effective  pain control. Denies nausea and vomiting. Patient is ambulating independently and voiding as expected. The rest of the hospital course was uneventful, patient met Bariatric Surgery D/C criteria and subsequently cleared for discharge home on POD#1. Pt denies pain at the time of discharge, 0 on a scale of 0-10, based on Likert pain scale.   NO extended VTE prophylaxis based on [facility] VTE Risk Stratification. The patient will follow a protocol-derived staged meal progression supervised by the dietitian in the outpatient setting. Patient will follow-up with Dr. Rolle in 10-14 days, medical doctor and dietitian in 30 days. All appropriate [facility] obtained from [facility] prior to discharge. Written discharge instruction explained and given.

## 2024-11-20 NOTE — DISCHARGE NOTE PROVIDER - NSDCCPCAREPLAN_GEN_ALL_CORE_FT
PRINCIPAL DISCHARGE DIAGNOSIS  Diagnosis: S/P gastric bypass  Assessment and Plan of Treatment: BATHING: Please do not submerge wound underwater. You may shower starting post op day #2.  Leave steri strips in place. They may fall off on their own. OR if you have dermabond (purple looking skin glue) do not scrub or pick. It will come off on its on. You may pat it dry after showering.  ACTIVITY: No heavy lifting or straining. Otherwise, you may return to your usual level of physical activity. You should ambulate every 4 hours while awake. It is important for your recovery process and for prevention of blood clots. If you are taking narcotic pain medication (such as Percocet) DO NOT drive a car, operate machinery or make important decisions.  DIET: Please follow Bariatric diet protocol. You may begin your full liquids when returning home. Encourage protein filled liquids.     RETURN TO THE EMERGENCY DEPARTMENT for any of the following - worsening pain, fever/chills, nausea/vomiting, altered mental status, chest pain, shortness of breath, or any other new / worsening symptom. to your usual diet.  NOTIFY YOUR SURGEON IF: You have any bleeding that does not stop, any pus draining from your wound(s), any fever (over 100.4 F) or chills, persistent nausea/vomiting, persistent diarrhea, or if your pain is not controlled on your discharge medications.  FOLLOW-UP: Please follow up with your primary care physician within 3-4weeks after surgery and your bariatric surgeon as discussed. also ensure follow up and continued communication with your dietary team and other support services.  Medications: You may use extra strength tylenol for pain control. Use miralax as needed for constipation and can use suppositories from below.

## 2024-11-20 NOTE — BRIEF OPERATIVE NOTE - OPERATION/FINDINGS
wc 2 . RA hiatal hernia repair and gastric bypass  leak test negative  EGD performed without issues

## 2024-11-20 NOTE — BRIEF OPERATIVE NOTE - NSICDXBRIEFPROCEDURE_GEN_ALL_CORE_FT
PROCEDURES:  Robot-assisted cholecystectomy 20-Nov-2024 14:05:35  Bev Sahni   PROCEDURES:  Revision, gastrojejunostomy, laparoscopic, with upper GI tract endoscopy 20-Nov-2024 15:57:25 Hiatal hernia repair, Umbilical Hernia Repair Enmanuel Horn

## 2024-11-20 NOTE — DISCHARGE NOTE PROVIDER - NSDCFUSCHEDAPPT_GEN_ALL_CORE_FT
Deny Rolle  [facility]  GENSURG [address]  Scheduled Appointment: 12/05/2024    Italo Serra  [facility]  NEUROLOGY [address]  Scheduled Appointment: 12/16/2024    Anjelica Saenz  [facility]  UROLOGY [address] [facility]  Scheduled Appointment: 01/02/2025    Colette Baker  [facility]  GENSURG [address]  Scheduled Appointment: 01/07/2025

## 2024-11-20 NOTE — PATIENT PROFILE ADULT - FALL HARM RISK - HARM RISK INTERVENTIONS

## 2024-11-20 NOTE — DISCHARGE NOTE PROVIDER - NSDCMRMEDTOKEN_GEN_ALL_CORE_FT
acetaminophen 500 mg oral tablet: 2 tab(s) orally 3 times a day  cetirizine 10 mg oral tablet: 1 tab(s) orally  famotidine 40 mg oral tablet: 1 tab(s) orally once a day as needed for  heartburn  Levsin SL 0.125 mg sublingual tablet: 1 tab(s) sublingually every 6 hours as needed for gastric spasm  lisinopril 5 mg oral tablet: 1 tab(s) orally once a day  omeprazole 40 mg oral delayed release capsule: 1 cap(s) orally 2 times a day  omeprazole 40 mg oral delayed release capsule: 1 cap(s) orally once a day  ondansetron 4 mg oral tablet: 1 tab(s) orally once a day as needed for  nausea  ondansetron 4 mg oral tablet, disintegratin tab(s) orally every 6 hours as needed for  nausea MDD: 4  SUMAtriptan 100 mg oral tablet: 1 tab(s) orally once a day as needed for migraine  topiramate 25 mg oral tablet: 1 tab(s) orally once a day (at bedtime)

## 2024-11-20 NOTE — DISCHARGE NOTE PROVIDER - CARE PROVIDER_API CALL
Deny Rolle Mondragon  Surgery  [address] [place] [id number]  Phone: [phone]  Fax: [phone]  Follow Up Time:

## 2024-11-20 NOTE — CHART NOTE - NSCHARTNOTEFT_GEN_A_CORE
POST-OPERATIVE NOTE    Subjective: patient seen at bedside with family, conversant, in no acute distress, reports pain is well managed, has been ambulating independently and without pain, has passed urine since returning from PACU  Patient is s/p Robotic Boom-en-Y gastric bypass, hiatal hernia repair, umbilical hernia repair, EGD    Vital Signs Last 24 Hrs  T(C): 36.6 (20 Nov 2024 17:43), Max: 36.7 (20 Nov 2024 15:30)  T(F): 97.8 (20 Nov 2024 17:43), Max: 98 (20 Nov 2024 15:30)  HR: 81 (20 Nov 2024 17:43) (72 - 95)  BP: 145/88 (20 Nov 2024 17:43) (107/72 - 147/95)  BP(mean): 98 (20 Nov 2024 15:00) (78 - 98)  RR: 16 (20 Nov 2024 17:43) (14 - 20)  SpO2: 96% (20 Nov 2024 17:43) (96% - 100%)    Parameters below as of 20 Nov 2024 17:43  Patient On (Oxygen Delivery Method): room air      I&O's Detail    heparin   Injectable 5000  lisinopril 5    PAST MEDICAL & SURGICAL HISTORY:  HTN (hypertension)      Hiatal hernia with GERD without esophagitis      Obese      TARIK (obstructive sleep apnea)      S/P laparoscopic sleeve gastrectomy            Physical Exam:  General: NAD, resting comfortably in bed  Pulmonary: Nonlabored breathing, no respiratory distress  Cardiovascular: NSR  Abdominal: soft, NT/ND, no rebound or guarding, incisions are c/d/i.   Extremities: P    LABS:                        11.9   12.18 )-----------( 270      ( 20 Nov 2024 14:00 )             35.7     11-20    138  |  102  |  16.7  ----------------------------<  161[H]  4.4   |  21.0[L]  |  0.91    Ca    8.3[L]      20 Nov 2024 14:00        CAPILLARY BLOOD GLUCOSE          Radiology and Additional Studies:    Assessment:  The patient is a 49y Female who is now several hours post-op from a Robotic Boom-en-Y gastric bypass, hiatal hernia repair, umbilical hernia repair, EGD. Progressing well post-operatively, pain is well managed, to reassess in the AM.     Plan:  - Pain control as needed  - Saad clear diet  - Antiemetics as needed per [facility] protocol  - No NSAIDs  - DVT ppx: SQH  - OOB and ambulating as tolerated  - F/u AM labs

## 2024-11-20 NOTE — ASU PREOP CHECKLIST - HEART RATE (BEATS/MIN)
How Severe Is Your Skin Lesion?: mild Have Your Skin Lesions Been Treated?: not been treated Is This A New Presentation, Or A Follow-Up?: Skin Lesions 72

## 2024-11-21 ENCOUNTER — TRANSCRIPTION ENCOUNTER (OUTPATIENT)
Age: 49
End: 2024-11-21

## 2024-11-21 VITALS
RESPIRATION RATE: 18 BRPM | SYSTOLIC BLOOD PRESSURE: 117 MMHG | OXYGEN SATURATION: 95 % | DIASTOLIC BLOOD PRESSURE: 78 MMHG | HEART RATE: 66 BPM | TEMPERATURE: 99 F

## 2024-11-21 LAB
BASOPHILS # BLD AUTO: 0.02 K/UL — SIGNIFICANT CHANGE UP (ref 0–0.2)
BASOPHILS NFR BLD AUTO: 0.2 % — SIGNIFICANT CHANGE UP (ref 0–2)
EOSINOPHIL # BLD AUTO: 0.02 K/UL — SIGNIFICANT CHANGE UP (ref 0–0.5)
EOSINOPHIL NFR BLD AUTO: 0.2 % — SIGNIFICANT CHANGE UP (ref 0–6)
HCT VFR BLD CALC: 37.2 % — SIGNIFICANT CHANGE UP (ref 34.5–45)
HGB BLD-MCNC: 12.2 G/DL — SIGNIFICANT CHANGE UP (ref 11.5–15.5)
IMM GRANULOCYTES NFR BLD AUTO: 0.3 % — SIGNIFICANT CHANGE UP (ref 0–0.9)
LYMPHOCYTES # BLD AUTO: 1.96 K/UL — SIGNIFICANT CHANGE UP (ref 1–3.3)
LYMPHOCYTES # BLD AUTO: 19.6 % — SIGNIFICANT CHANGE UP (ref 13–44)
MCHC RBC-ENTMCNC: 27.5 PG — SIGNIFICANT CHANGE UP (ref 27–34)
MCHC RBC-ENTMCNC: 32.8 G/DL — SIGNIFICANT CHANGE UP (ref 32–36)
MCV RBC AUTO: 84 FL — SIGNIFICANT CHANGE UP (ref 80–100)
MONOCYTES # BLD AUTO: 0.6 K/UL — SIGNIFICANT CHANGE UP (ref 0–0.9)
MONOCYTES NFR BLD AUTO: 6 % — SIGNIFICANT CHANGE UP (ref 2–14)
NEUTROPHILS # BLD AUTO: 7.36 K/UL — SIGNIFICANT CHANGE UP (ref 1.8–7.4)
NEUTROPHILS NFR BLD AUTO: 73.7 % — SIGNIFICANT CHANGE UP (ref 43–77)
PLATELET # BLD AUTO: 230 K/UL — SIGNIFICANT CHANGE UP (ref 150–400)
RBC # BLD: 4.43 M/UL — SIGNIFICANT CHANGE UP (ref 3.8–5.2)
RBC # FLD: 15.1 % — HIGH (ref 10.3–14.5)
WBC # BLD: 9.99 K/UL — SIGNIFICANT CHANGE UP (ref 3.8–10.5)
WBC # FLD AUTO: 9.99 K/UL — SIGNIFICANT CHANGE UP (ref 3.8–10.5)

## 2024-11-21 PROCEDURE — 85025 COMPLETE CBC W/AUTO DIFF WBC: CPT

## 2024-11-21 PROCEDURE — 36415 COLL VENOUS BLD VENIPUNCTURE: CPT

## 2024-11-21 PROCEDURE — C1889: CPT

## 2024-11-21 PROCEDURE — 80048 BASIC METABOLIC PNL TOTAL CA: CPT

## 2024-11-21 PROCEDURE — S2900: CPT

## 2024-11-21 RX ADMIN — KETOROLAC TROMETHAMINE 15 MILLIGRAM(S): 30 INJECTION INTRAMUSCULAR; INTRAVENOUS at 00:16

## 2024-11-21 RX ADMIN — KETOROLAC TROMETHAMINE 15 MILLIGRAM(S): 30 INJECTION INTRAMUSCULAR; INTRAVENOUS at 12:22

## 2024-11-21 RX ADMIN — Medication 0.12 MILLIGRAM(S): at 08:16

## 2024-11-21 RX ADMIN — Medication 5000 UNIT(S): at 12:22

## 2024-11-21 RX ADMIN — Medication 125 MILLILITER(S): at 01:25

## 2024-11-21 RX ADMIN — ACETAMINOPHEN 500MG 400 MILLIGRAM(S): 500 TABLET, COATED ORAL at 04:42

## 2024-11-21 RX ADMIN — ACETAMINOPHEN 500MG 1000 MILLIGRAM(S): 500 TABLET, COATED ORAL at 05:52

## 2024-11-21 RX ADMIN — KETOROLAC TROMETHAMINE 15 MILLIGRAM(S): 30 INJECTION INTRAMUSCULAR; INTRAVENOUS at 13:11

## 2024-11-21 RX ADMIN — Medication 5000 UNIT(S): at 01:23

## 2024-11-21 RX ADMIN — ONDANSETRON HYDROCHLORIDE 4 MILLIGRAM(S): 4 TABLET, FILM COATED ORAL at 05:10

## 2024-11-21 RX ADMIN — KETOROLAC TROMETHAMINE 15 MILLIGRAM(S): 30 INJECTION INTRAMUSCULAR; INTRAVENOUS at 06:10

## 2024-11-21 RX ADMIN — ONDANSETRON HYDROCHLORIDE 4 MILLIGRAM(S): 4 TABLET, FILM COATED ORAL at 12:22

## 2024-11-21 RX ADMIN — LORAZEPAM 0.5 MILLIGRAM(S): 2 TABLET ORAL at 08:16

## 2024-11-21 RX ADMIN — KETOROLAC TROMETHAMINE 15 MILLIGRAM(S): 30 INJECTION INTRAMUSCULAR; INTRAVENOUS at 05:10

## 2024-11-21 NOTE — PHARMACOTHERAPY INTERVENTION NOTE - COMMENTS
Reinforced discussion points from previous counseling. Informed patient of new prescriptions sent to pharmacy.   Reinforced discussion points from previous counseling. Informed patient of new prescriptions sent to pharmacy.     services utilized, ID # [id number].

## 2024-11-21 NOTE — DISCHARGE NOTE NURSING/CASE MANAGEMENT/SOCIAL WORK - NSDCPEFALRISK_GEN_ALL_CORE
For information on Fall & Injury Prevention, visit: [email or web address] OR  [email or web address] OR  https://www.cdc.gov/steadi/patient.html fingers/toes warm to touch

## 2024-11-21 NOTE — DISCHARGE NOTE NURSING/CASE MANAGEMENT/SOCIAL WORK - FINANCIAL ASSISTANCE
[facility] provides services at a reduced cost to those who are determined to be eligible through [facility]’s financial assistance program. Information regarding [facility]’s financial assistance program can be found by going to [email or web address] or by calling [phone].

## 2024-11-21 NOTE — CHART NOTE - NSCHARTNOTEFT_GEN_A_CORE
Consult received for bariatric surgery diet education. Pt is now s/p Boom en Y gastric bypass. Pt sleeping soundly at time of visit this morning, opened eyes, however fell back to sleep. Left packet for bariatric bypass diet education in [place] on bedside table in pt's room. Bariatric clear liquid breakfast tray observed untouched. Per notes, pt has been tolerating diet well. RD remains available.

## 2024-11-21 NOTE — PROGRESS NOTE ADULT - SUBJECTIVE AND OBJECTIVE BOX
Subjective: Patient seen at bedside, complaint of some epigastric pain, no overnight events, denies cp/sob/n/v./ Patient tolerating rubi clear diet, voiding freely, ambulating independently.       MEDICATIONS  (STANDING):  acetaminophen     Tablet .. 975 milliGRAM(s) Oral every 8 hours  acetaminophen   IVPB .. 1000 milliGRAM(s) IV Intermittent once  BUpivacaine liposome 1.3% Injectable 20 milliLiter(s) Local Injection once  heparin   Injectable 5000 Unit(s) SubCutaneous every 8 hours  influenza   Vaccine 0.5 milliLiter(s) IntraMuscular once  ketorolac   Injectable 15 milliGRAM(s) IV Push every 6 hours  lactated ringers. 1000 milliLiter(s) (125 mL/Hr) IV Continuous <Continuous>  lisinopril 5 milliGRAM(s) Oral daily  ondansetron Injectable 4 milliGRAM(s) IV Push every 6 hours  pantoprazole  Injectable 40 milliGRAM(s) IV Push every 24 hours  topiramate 25 milliGRAM(s) Oral at bedtime    MEDICATIONS  (PRN):  hyoscyamine SL 0.125 milliGRAM(s) SubLingual every 4 hours PRN Gastric Spasms  ketorolac   Injectable 15 milliGRAM(s) IV Push every 6 hours PRN breakthrough pain  LORazepam   Injectable 0.5 milliGRAM(s) IV Push once PRN Esophageal Spasm  metoclopramide Injectable 10 milliGRAM(s) IV Push every 6 hours PRN nausea  SUMAtriptan 100 milliGRAM(s) Oral every 24 hours PRN Migraine      Vital Signs Last 24 Hrs  T(C): 37 (21 Nov 2024 04:05), Max: 37 (21 Nov 2024 04:05)  T(F): 98.6 (21 Nov 2024 04:05), Max: 98.6 (21 Nov 2024 04:05)  HR: 62 (21 Nov 2024 04:05) (62 - 95)  BP: 105/69 (21 Nov 2024 04:05) (105/69 - 147/95)  BP(mean): 98 (20 Nov 2024 15:00) (78 - 98)  RR: 18 (21 Nov 2024 04:05) (14 - 20)  SpO2: 97% (21 Nov 2024 04:05) (96% - 100%)    Parameters below as of 21 Nov 2024 04:05  Patient On (Oxygen Delivery Method): room air        Physical Exam:    Constitutional: NAD  HEENT: PERRL, EOMI  Respiratory: Respirations non-labored, no accessory muscle use  Gastrointestinal: Soft, appropriately tender mainly in epigastrum, non-distended, incisions c/d/i with dermabond in place  Neurological: A&O x 3      LABS:                        11.9   12.18 )-----------( 270      ( 20 Nov 2024 14:00 )             35.7     11-20    138  |  102  |  16.7  ----------------------------<  161[H]  4.4   |  21.0[L]  |  0.91    Ca    8.3[L]      20 Nov 2024 14:00        Urinalysis Basic - ( 20 Nov 2024 14:00 )    Color: x / Appearance: x / SG: x / pH: x  Gluc: 161 mg/dL / Ketone: x  / Bili: x / Urobili: x   Blood: x / Protein: x / Nitrite: x   Leuk Esterase: x / RBC: x / WBC x   Sq Epi: x / Non Sq Epi: x / Bacteria: x        A: 49F s/p RnY GB, HH repair, and umbilical hernia repair, post op course as expected    P:  AM labs/trend HH  Rubi clear diet  Multi-modal pain control  OOB as toelrated  Stop IVF  DVT PPX: LIZZIE  Dispo planning

## 2024-11-21 NOTE — PHARMACOTHERAPY INTERVENTION NOTE - OUTCOME
accepted
accepted
MD Statement: I personally performed the services described in the documentation, reviewed the documentation recorded by the scribe in my presence and it accurately and completely records my words and actions

## 2024-11-21 NOTE — DISCHARGE NOTE NURSING/CASE MANAGEMENT/SOCIAL WORK - PATIENT PORTAL LINK FT
You can access the FollowMyHealth Patient Portal offered by [facility] by registering at the following website: [email or web address]. By joining [email or web address] [email or web address] portal, you will also be able to view your health information using other applications (apps) compatible with our system.

## 2024-11-22 ENCOUNTER — NON-APPOINTMENT (OUTPATIENT)
Age: 49
End: 2024-11-22

## 2024-11-24 ENCOUNTER — INPATIENT (INPATIENT)
Facility: HOSPITAL | Age: 49
LOS: 2 days | Discharge: HOME | DRG: 394 | End: 2024-11-27
Attending: SURGERY | Admitting: SURGERY
Payer: COMMERCIAL

## 2024-11-24 VITALS
OXYGEN SATURATION: 98 % | RESPIRATION RATE: 18 BRPM | DIASTOLIC BLOOD PRESSURE: 79 MMHG | WEIGHT: 186.07 LBS | SYSTOLIC BLOOD PRESSURE: 129 MMHG | TEMPERATURE: 99 F | HEART RATE: 95 BPM | HEIGHT: 64 IN

## 2024-11-24 DIAGNOSIS — Z98.84 BARIATRIC SURGERY STATUS: Chronic | ICD-10-CM

## 2024-11-24 LAB
BASOPHILS # BLD AUTO: 0.02 K/UL — SIGNIFICANT CHANGE UP (ref 0–0.2)
BASOPHILS NFR BLD AUTO: 0.2 % — SIGNIFICANT CHANGE UP (ref 0–2)
EOSINOPHIL # BLD AUTO: 0.03 K/UL — SIGNIFICANT CHANGE UP (ref 0–0.5)
EOSINOPHIL NFR BLD AUTO: 0.3 % — SIGNIFICANT CHANGE UP (ref 0–6)
HCT VFR BLD CALC: 42.8 % — SIGNIFICANT CHANGE UP (ref 34.5–45)
HGB BLD-MCNC: 14.2 G/DL — SIGNIFICANT CHANGE UP (ref 11.5–15.5)
IMM GRANULOCYTES NFR BLD AUTO: 0.3 % — SIGNIFICANT CHANGE UP (ref 0–0.9)
LYMPHOCYTES # BLD AUTO: 1.47 K/UL — SIGNIFICANT CHANGE UP (ref 1–3.3)
LYMPHOCYTES # BLD AUTO: 13.4 % — SIGNIFICANT CHANGE UP (ref 13–44)
MCHC RBC-ENTMCNC: 27.6 PG — SIGNIFICANT CHANGE UP (ref 27–34)
MCHC RBC-ENTMCNC: 33.2 G/DL — SIGNIFICANT CHANGE UP (ref 32–36)
MCV RBC AUTO: 83.3 FL — SIGNIFICANT CHANGE UP (ref 80–100)
MONOCYTES # BLD AUTO: 0.5 K/UL — SIGNIFICANT CHANGE UP (ref 0–0.9)
MONOCYTES NFR BLD AUTO: 4.6 % — SIGNIFICANT CHANGE UP (ref 2–14)
NEUTROPHILS # BLD AUTO: 8.89 K/UL — HIGH (ref 1.8–7.4)
NEUTROPHILS NFR BLD AUTO: 81.2 % — HIGH (ref 43–77)
PLATELET # BLD AUTO: 323 K/UL — SIGNIFICANT CHANGE UP (ref 150–400)
RBC # BLD: 5.14 M/UL — SIGNIFICANT CHANGE UP (ref 3.8–5.2)
RBC # FLD: 15 % — HIGH (ref 10.3–14.5)
WBC # BLD: 10.94 K/UL — HIGH (ref 3.8–10.5)
WBC # FLD AUTO: 10.94 K/UL — HIGH (ref 3.8–10.5)

## 2024-11-24 PROCEDURE — 99285 EMERGENCY DEPT VISIT HI MDM: CPT

## 2024-11-24 PROCEDURE — 74177 CT ABD & PELVIS W/CONTRAST: CPT | Mod: 26,MC

## 2024-11-24 RX ORDER — SODIUM CHLORIDE 9 MG/ML
1000 INJECTION, SOLUTION INTRAMUSCULAR; INTRAVENOUS; SUBCUTANEOUS ONCE
Refills: 0 | Status: COMPLETED | OUTPATIENT
Start: 2024-11-24 | End: 2024-11-24

## 2024-11-24 RX ORDER — IOHEXOL 300 MG/ML
30 INJECTION, SOLUTION INTRAVENOUS ONCE
Refills: 0 | Status: COMPLETED | OUTPATIENT
Start: 2024-11-24 | End: 2024-11-24

## 2024-11-24 RX ORDER — ONDANSETRON HYDROCHLORIDE 4 MG/1
4 TABLET, FILM COATED ORAL ONCE
Refills: 0 | Status: COMPLETED | OUTPATIENT
Start: 2024-11-24 | End: 2024-11-24

## 2024-11-24 RX ADMIN — IOHEXOL 30 MILLILITER(S): 300 INJECTION, SOLUTION INTRAVENOUS at 22:31

## 2024-11-24 RX ADMIN — SODIUM CHLORIDE 1000 MILLILITER(S): 9 INJECTION, SOLUTION INTRAMUSCULAR; INTRAVENOUS; SUBCUTANEOUS at 22:31

## 2024-11-24 NOTE — ED ADULT NURSE NOTE - OBJECTIVE STATEMENT
Pt A&Ox4 presenting to the ED c/o n/v. PMH gastristis (gastric bypass on 11/20, no BM since then), HTN. Pt denies fever, chills, chest pain, shortness of breath, Pt endorses vomiting for multiple days and nausea. Pt respirations are even and nonlabored and pt is NAD, Bed is locked and in lowest position with daughter at bedside and safety maintained,

## 2024-11-24 NOTE — ED ADULT NURSE NOTE - BREATHING, MLM
Ok to start Bumex 1mg PO daily, 30 tabs, 1 refill  Let me know if swelling does not improve    AYUSH Milan   Spontaneous, unlabored and symmetrical

## 2024-11-24 NOTE — ED ADULT NURSE NOTE - NSICDXPASTMEDICALHX_GEN_ALL_CORE_FT
PAST MEDICAL HISTORY:  Hiatal hernia with GERD without esophagitis     HTN (hypertension)     Obese     TARIK (obstructive sleep apnea)

## 2024-11-24 NOTE — ED ADULT NURSE NOTE - TEMPLATE
vanc 1450mg IV every 24hr x 8 weeks total till 12/3 per ID ; will stay here for the duration   PT  vanc now 1000mg IV every 24hr till 12/3 per ID last vanc trough 10/31 15.8    Reach out to ns today for guidance on re-imaging  Noted per ID Plan for 6 - 8 weeks of IV antibiotics with repeat MRI at midpoint of treatment as no surgical drainage undertaken.  - ID will follow.  She is currently midpoint; will plan for MRI of thoracic on Monday .  Cont vanc now 750mg IV daily with trough 11/7 16.3 .  11/13 remains max assist; can stand for 1m 20sec; awaiting ID assessment of MRI    11/11 contacted neurosurgery for repeat MRI review. She is only able to stand at bedside with therapy for 1min and 15 sec with mod to max assist. She would be a difficult transfer at this point.   11/16 will repeat message to neurosurgery as well as ID to eval patient maybe virtual visit. She would be a very difficult transfer for in person visit  11/18 no response from ID ; pt set up for AYANNA for OP appnt with ID today   11/20 Still waiting on ID recs, multiple messages sent to neurosurgery; Georgina Morrison and Luis E Calabrese as well as ID; Neftali Rm. We are trying to follow discharge plan from acute stay with repeat MRI and ID follow up and awaiting recommendations. Cont current treatment pending recs    11/23 Dr juan discussed case with Dr Rm and recs to cont plan with vanc till 12/3.   General

## 2024-11-24 NOTE — ED ADULT NURSE NOTE - NSFALLUNIVINTERV_ED_ALL_ED
Bed/Stretcher in lowest position, wheels locked, appropriate side rails in place/Call bell, personal items and telephone in reach/Instruct patient to call for assistance before getting out of bed/chair/stretcher/Non-slip footwear applied when patient is off stretcher/[place] to call system/Physically safe environment - no spills, clutter or unnecessary equipment/Purposeful proactive rounding/Room/bathroom lighting operational, light cord in reach

## 2024-11-25 DIAGNOSIS — K56.7 ILEUS, UNSPECIFIED: ICD-10-CM

## 2024-11-25 DIAGNOSIS — Z98.84 BARIATRIC SURGERY STATUS: Chronic | ICD-10-CM

## 2024-11-25 LAB
ALBUMIN SERPL ELPH-MCNC: 3.6 G/DL — SIGNIFICANT CHANGE UP (ref 3.3–5.2)
ALBUMIN SERPL ELPH-MCNC: 4 G/DL — SIGNIFICANT CHANGE UP (ref 3.3–5.2)
ALP SERPL-CCNC: 113 U/L — SIGNIFICANT CHANGE UP (ref 40–120)
ALP SERPL-CCNC: 123 U/L — HIGH (ref 40–120)
ALT FLD-CCNC: 107 U/L — HIGH
ALT FLD-CCNC: 80 U/L — HIGH
ANION GAP SERPL CALC-SCNC: 13 MMOL/L — SIGNIFICANT CHANGE UP (ref 5–17)
ANION GAP SERPL CALC-SCNC: 14 MMOL/L — SIGNIFICANT CHANGE UP (ref 5–17)
ANION GAP SERPL CALC-SCNC: 18 MMOL/L — HIGH (ref 5–17)
APTT BLD: 38.8 SEC — HIGH (ref 24.5–35.6)
AST SERPL-CCNC: 26 U/L — SIGNIFICANT CHANGE UP
AST SERPL-CCNC: 65 U/L — HIGH
BASOPHILS # BLD AUTO: 0.01 K/UL — SIGNIFICANT CHANGE UP (ref 0–0.2)
BASOPHILS NFR BLD AUTO: 0.1 % — SIGNIFICANT CHANGE UP (ref 0–2)
BILIRUB SERPL-MCNC: 0.4 MG/DL — SIGNIFICANT CHANGE UP (ref 0.4–2)
BILIRUB SERPL-MCNC: 0.4 MG/DL — SIGNIFICANT CHANGE UP (ref 0.4–2)
BLD GP AB SCN SERPL QL: SIGNIFICANT CHANGE UP
BUN SERPL-MCNC: 18.1 MG/DL — SIGNIFICANT CHANGE UP (ref 8–20)
BUN SERPL-MCNC: 20.2 MG/DL — HIGH (ref 8–20)
BUN SERPL-MCNC: 20.3 MG/DL — HIGH (ref 8–20)
CALCIUM SERPL-MCNC: 8.5 MG/DL — SIGNIFICANT CHANGE UP (ref 8.4–10.5)
CALCIUM SERPL-MCNC: 8.6 MG/DL — SIGNIFICANT CHANGE UP (ref 8.4–10.5)
CALCIUM SERPL-MCNC: 9.3 MG/DL — SIGNIFICANT CHANGE UP (ref 8.4–10.5)
CHLORIDE SERPL-SCNC: 100 MMOL/L — SIGNIFICANT CHANGE UP (ref 96–108)
CHLORIDE SERPL-SCNC: 105 MMOL/L — SIGNIFICANT CHANGE UP (ref 96–108)
CHLORIDE SERPL-SCNC: 99 MMOL/L — SIGNIFICANT CHANGE UP (ref 96–108)
CO2 SERPL-SCNC: 19 MMOL/L — LOW (ref 22–29)
CO2 SERPL-SCNC: 20 MMOL/L — LOW (ref 22–29)
CO2 SERPL-SCNC: 20 MMOL/L — LOW (ref 22–29)
CREAT SERPL-MCNC: 0.76 MG/DL — SIGNIFICANT CHANGE UP (ref 0.5–1.3)
CREAT SERPL-MCNC: 0.77 MG/DL — SIGNIFICANT CHANGE UP (ref 0.5–1.3)
CREAT SERPL-MCNC: 0.87 MG/DL — SIGNIFICANT CHANGE UP (ref 0.5–1.3)
EGFR: 82 ML/MIN/1.73M2 — SIGNIFICANT CHANGE UP
EGFR: 95 ML/MIN/1.73M2 — SIGNIFICANT CHANGE UP
EGFR: 97 ML/MIN/1.73M2 — SIGNIFICANT CHANGE UP
EOSINOPHIL # BLD AUTO: 0.01 K/UL — SIGNIFICANT CHANGE UP (ref 0–0.5)
EOSINOPHIL NFR BLD AUTO: 0.1 % — SIGNIFICANT CHANGE UP (ref 0–6)
GLUCOSE SERPL-MCNC: 101 MG/DL — HIGH (ref 70–99)
GLUCOSE SERPL-MCNC: 108 MG/DL — HIGH (ref 70–99)
GLUCOSE SERPL-MCNC: 95 MG/DL — SIGNIFICANT CHANGE UP (ref 70–99)
HCG SERPL-ACNC: 4.9 MIU/ML — HIGH
HCG SERPL-ACNC: 4.9 MIU/ML — HIGH
HCT VFR BLD CALC: 37.6 % — SIGNIFICANT CHANGE UP (ref 34.5–45)
HGB BLD-MCNC: 12.5 G/DL — SIGNIFICANT CHANGE UP (ref 11.5–15.5)
IMM GRANULOCYTES NFR BLD AUTO: 0.4 % — SIGNIFICANT CHANGE UP (ref 0–0.9)
INR BLD: 1.12 RATIO — SIGNIFICANT CHANGE UP (ref 0.85–1.16)
LIDOCAIN IGE QN: 54 U/L — HIGH (ref 22–51)
LYMPHOCYTES # BLD AUTO: 0.88 K/UL — LOW (ref 1–3.3)
LYMPHOCYTES # BLD AUTO: 11.8 % — LOW (ref 13–44)
MAGNESIUM SERPL-MCNC: 1.7 MG/DL — SIGNIFICANT CHANGE UP (ref 1.6–2.6)
MCHC RBC-ENTMCNC: 27.2 PG — SIGNIFICANT CHANGE UP (ref 27–34)
MCHC RBC-ENTMCNC: 33.2 G/DL — SIGNIFICANT CHANGE UP (ref 32–36)
MCV RBC AUTO: 81.7 FL — SIGNIFICANT CHANGE UP (ref 80–100)
MONOCYTES # BLD AUTO: 0.12 K/UL — SIGNIFICANT CHANGE UP (ref 0–0.9)
MONOCYTES NFR BLD AUTO: 1.6 % — LOW (ref 2–14)
NEUTROPHILS # BLD AUTO: 6.4 K/UL — SIGNIFICANT CHANGE UP (ref 1.8–7.4)
NEUTROPHILS NFR BLD AUTO: 86 % — HIGH (ref 43–77)
PHOSPHATE SERPL-MCNC: 3.7 MG/DL — SIGNIFICANT CHANGE UP (ref 2.4–4.7)
PLATELET # BLD AUTO: 304 K/UL — SIGNIFICANT CHANGE UP (ref 150–400)
POTASSIUM SERPL-MCNC: 4.7 MMOL/L — SIGNIFICANT CHANGE UP (ref 3.5–5.3)
POTASSIUM SERPL-MCNC: 5.2 MMOL/L — SIGNIFICANT CHANGE UP (ref 3.5–5.3)
POTASSIUM SERPL-MCNC: 5.6 MMOL/L — HIGH (ref 3.5–5.3)
POTASSIUM SERPL-SCNC: 4.7 MMOL/L — SIGNIFICANT CHANGE UP (ref 3.5–5.3)
POTASSIUM SERPL-SCNC: 5.2 MMOL/L — SIGNIFICANT CHANGE UP (ref 3.5–5.3)
POTASSIUM SERPL-SCNC: 5.6 MMOL/L — HIGH (ref 3.5–5.3)
PROT SERPL-MCNC: 6.8 G/DL — SIGNIFICANT CHANGE UP (ref 6.6–8.7)
PROT SERPL-MCNC: 8 G/DL — SIGNIFICANT CHANGE UP (ref 6.6–8.7)
PROTHROM AB SERPL-ACNC: 13 SEC — SIGNIFICANT CHANGE UP (ref 9.9–13.4)
RBC # BLD: 4.6 M/UL — SIGNIFICANT CHANGE UP (ref 3.8–5.2)
RBC # FLD: 15.1 % — HIGH (ref 10.3–14.5)
SODIUM SERPL-SCNC: 132 MMOL/L — LOW (ref 135–145)
SODIUM SERPL-SCNC: 137 MMOL/L — SIGNIFICANT CHANGE UP (ref 135–145)
SODIUM SERPL-SCNC: 139 MMOL/L — SIGNIFICANT CHANGE UP (ref 135–145)
WBC # BLD: 7.45 K/UL — SIGNIFICANT CHANGE UP (ref 3.8–10.5)
WBC # FLD AUTO: 7.45 K/UL — SIGNIFICANT CHANGE UP (ref 3.8–10.5)

## 2024-11-25 PROCEDURE — 74019 RADEX ABDOMEN 2 VIEWS: CPT | Mod: 26

## 2024-11-25 PROCEDURE — 93010 ELECTROCARDIOGRAM REPORT: CPT

## 2024-11-25 RX ORDER — ACETAMINOPHEN 500MG 500 MG/1
1000 TABLET, COATED ORAL EVERY 6 HOURS
Refills: 0 | Status: COMPLETED | OUTPATIENT
Start: 2024-11-25 | End: 2024-11-25

## 2024-11-25 RX ORDER — LISINOPRIL 20 MG/1
5 TABLET ORAL DAILY
Refills: 0 | Status: DISCONTINUED | OUTPATIENT
Start: 2024-11-25 | End: 2024-11-27

## 2024-11-25 RX ORDER — INFLUENZA VIRUS VACCINE 15; 15; 15; 15 UG/.5ML; UG/.5ML; UG/.5ML; UG/.5ML
0.5 SUSPENSION INTRAMUSCULAR ONCE
Refills: 0 | Status: DISCONTINUED | OUTPATIENT
Start: 2024-11-25 | End: 2024-11-27

## 2024-11-25 RX ORDER — HYOSCYAMINE SULFATE 0.125 MG
0.12 TABLET, SUBLINGUAL SUBLINGUAL EVERY 4 HOURS
Refills: 0 | Status: DISCONTINUED | OUTPATIENT
Start: 2024-11-25 | End: 2024-11-27

## 2024-11-25 RX ORDER — ENOXAPARIN SODIUM 30 MG/.3ML
40 INJECTION SUBCUTANEOUS EVERY 24 HOURS
Refills: 0 | Status: DISCONTINUED | OUTPATIENT
Start: 2024-11-25 | End: 2024-11-27

## 2024-11-25 RX ORDER — DEXAMETHASONE 1.5 MG/1
4 TABLET ORAL ONCE
Refills: 0 | Status: COMPLETED | OUTPATIENT
Start: 2024-11-25 | End: 2024-11-25

## 2024-11-25 RX ORDER — ACETAMINOPHEN 500MG 500 MG/1
1000 TABLET, COATED ORAL ONCE
Refills: 0 | Status: COMPLETED | OUTPATIENT
Start: 2024-11-25 | End: 2024-11-25

## 2024-11-25 RX ORDER — DIPHENHYDRAMINE HCL 25 MG
25 CAPSULE ORAL ONCE
Refills: 0 | Status: COMPLETED | OUTPATIENT
Start: 2024-11-25 | End: 2024-11-25

## 2024-11-25 RX ORDER — DEXAMETHASONE 1.5 MG/1
4 TABLET ORAL EVERY 12 HOURS
Refills: 0 | Status: DISCONTINUED | OUTPATIENT
Start: 2024-11-25 | End: 2024-11-27

## 2024-11-25 RX ORDER — TOPIRAMATE 25 MG/1
25 TABLET ORAL AT BEDTIME
Refills: 0 | Status: DISCONTINUED | OUTPATIENT
Start: 2024-11-25 | End: 2024-11-27

## 2024-11-25 RX ORDER — ONDANSETRON HYDROCHLORIDE 4 MG/1
4 TABLET, FILM COATED ORAL EVERY 6 HOURS
Refills: 0 | Status: DISCONTINUED | OUTPATIENT
Start: 2024-11-25 | End: 2024-11-27

## 2024-11-25 RX ORDER — 0.9 % SODIUM CHLORIDE 0.9 %
1000 INTRAVENOUS SOLUTION INTRAVENOUS
Refills: 0 | Status: DISCONTINUED | OUTPATIENT
Start: 2024-11-25 | End: 2024-11-26

## 2024-11-25 RX ADMIN — ACETAMINOPHEN 500MG 1000 MILLIGRAM(S): 500 TABLET, COATED ORAL at 06:53

## 2024-11-25 RX ADMIN — ACETAMINOPHEN 500MG 400 MILLIGRAM(S): 500 TABLET, COATED ORAL at 00:41

## 2024-11-25 RX ADMIN — Medication 25 MILLIGRAM(S): at 01:30

## 2024-11-25 RX ADMIN — ACETAMINOPHEN 500MG 400 MILLIGRAM(S): 500 TABLET, COATED ORAL at 05:53

## 2024-11-25 RX ADMIN — ONDANSETRON HYDROCHLORIDE 4 MILLIGRAM(S): 4 TABLET, FILM COATED ORAL at 00:13

## 2024-11-25 RX ADMIN — DEXAMETHASONE 4 MILLIGRAM(S): 1.5 TABLET ORAL at 17:25

## 2024-11-25 RX ADMIN — Medication 120 MILLILITER(S): at 17:26

## 2024-11-25 RX ADMIN — Medication 25 GRAM(S): at 12:52

## 2024-11-25 RX ADMIN — Medication 120 MILLILITER(S): at 02:09

## 2024-11-25 RX ADMIN — DEXAMETHASONE 4 MILLIGRAM(S): 1.5 TABLET ORAL at 02:09

## 2024-11-25 RX ADMIN — TOPIRAMATE 25 MILLIGRAM(S): 25 TABLET ORAL at 23:20

## 2024-11-25 RX ADMIN — ACETAMINOPHEN 500MG 1000 MILLIGRAM(S): 500 TABLET, COATED ORAL at 18:26

## 2024-11-25 RX ADMIN — ACETAMINOPHEN 500MG 400 MILLIGRAM(S): 500 TABLET, COATED ORAL at 17:26

## 2024-11-25 RX ADMIN — Medication 120 MILLILITER(S): at 06:00

## 2024-11-25 RX ADMIN — ACETAMINOPHEN 500MG 1000 MILLIGRAM(S): 500 TABLET, COATED ORAL at 12:59

## 2024-11-25 RX ADMIN — ACETAMINOPHEN 500MG 400 MILLIGRAM(S): 500 TABLET, COATED ORAL at 11:59

## 2024-11-25 RX ADMIN — ACETAMINOPHEN 500MG 400 MILLIGRAM(S): 500 TABLET, COATED ORAL at 22:55

## 2024-11-25 RX ADMIN — ENOXAPARIN SODIUM 40 MILLIGRAM(S): 30 INJECTION SUBCUTANEOUS at 05:53

## 2024-11-25 RX ADMIN — ACETAMINOPHEN 500MG 1000 MILLIGRAM(S): 500 TABLET, COATED ORAL at 23:55

## 2024-11-25 NOTE — H&P ADULT - HISTORY OF PRESENT ILLNESS
Ms. Staton is a pleasant 49F with a PMHx of GERD, htn, migraines who is POD4 s/p nohelia en y gastric bypass who p/w abdominal pain x 2 days and vomiting x1 day.  She describes intermittent sharp pains in her periumbilical region and epigastrium.  She has not been able to tolerate her liquid diet since this morning.  She has not had a BM since her surgery.  Denies fevers or chills.

## 2024-11-25 NOTE — H&P ADULT - NSHPLABSRESULTS_GEN_ALL_CORE
14.2   10.94 )-----------( 323      ( 24 Nov 2024 22:32 )             42.8   11-24    137  |  99  |  20.3[H]  ----------------------------<  101[H]  5.6[H]   |  20.0[L]  |  0.87    Ca    9.3      24 Nov 2024 22:32    TPro  8.0  /  Alb  4.0  /  TBili  0.4  /  DBili  x   /  AST  65[H]  /  ALT  107[H]  /  AlkPhos  123[H]  11-24    < from: CT Abdomen and Pelvis w/ Oral Cont and w/ IV Cont (11.24.24 @ 23:27) >    BOWEL: Patient is status post gastric bypass. There is a small 4.0 x 2.2   cm fluid collection with small droplets of free air in the left upper   quadrant likely postsurgical. The efferent limb is fluid-filled and   dilated to 4.2 cm to the level of the JJ anastomosis. There is mild   thickening of the bowel close to the JJ anastomosis. This could represent   ileus or obstruction at the level of the anastomosis. The afferent   pancreaticoduodenal limb is unremarkable No bowel obstruction. Appendix   is normal.    < end of copied text >

## 2024-11-25 NOTE — ED PROVIDER NOTE - PHYSICAL EXAMINATION
Gen: no acute distress  Head: normocephalic, atraumatic  EENT: EOMI  Lung: no increased work of breathing  CV: normal s1/s2  Abd: soft, +TTP in left periumbilical area  MSK: no visible deformities, full range of motion in all 4 extremities  Neuro: A&Ox4; No focal neurologic deficits

## 2024-11-25 NOTE — ED PROVIDER NOTE - OBJECTIVE STATEMENT
49yF with h/o GERD, htn, migraines presenting with nausea and vomiting. Patient recently underwent gastric bypass surgery with Dr. Rolle. Yesterday she started to develop left sided abdominal pain and today she developed nausea and vomiting. No reported chest pain, dyspnea, dysuria, back pain, fever, chills.

## 2024-11-25 NOTE — ED PROVIDER NOTE - CLINICAL SUMMARY MEDICAL DECISION MAKING FREE TEXT BOX
49yF with h/o GERD, htn, migraines presenting with nausea and vomiting. Patient afebrile and HDS. Has left periumbilical ttp. Will check basic labs and send for CT abd/pel to evaluate for sbo, anastomotic leak, abscess, and other post-op complications. Surgery aware of patient's presence in ED.

## 2024-11-25 NOTE — H&P ADULT - ATTENDING COMMENTS
Patient admitted with concern for early postoperative bowel obstruction  This morning, she reports no pain and decrease in nausea  Abdominal x-ray shows decreased distention  Will plan for continue NPO, no NGT placement, will plan for upper GI x-ray likely tomorrow to confirm passage of contrast distally  Discussed with patient need for possible operative intervention if she fails to improve with nonoperative management.

## 2024-11-25 NOTE — PATIENT PROFILE ADULT - FALL HARM RISK - HARM RISK INTERVENTIONS

## 2024-11-25 NOTE — H&P ADULT - ASSESSMENT
49F who is POD 4 s/p nohelia en y gastric bypass presents with signs and symptoms concerning for post op ileus vs obstruction at her JJ anastomosis.  She is HD stable but not tolerating PO intake.      Plan  - Admit to bariatric surgery  - Upper GI series in AM  - NPO  - IVF  - Repeat BMP  - Multimodal pain control  - Anti emetics  - Decadron x 1  - DVT ppx SCDs and lovenox    Discussed with Dr. Rolle

## 2024-11-25 NOTE — H&P ADULT - NSICDXPASTSURGICALHX_GEN_ALL_CORE_FT
PAST SURGICAL HISTORY:  History of Boom-en-Y gastric bypass     S/P laparoscopic sleeve gastrectomy

## 2024-11-25 NOTE — H&P ADULT - NSHPPHYSICALEXAM_GEN_ALL_CORE
T(C): 37.1 (11-24-24 @ 23:11), Max: 37.1 (11-24-24 @ 20:24)  HR: 83 (11-24-24 @ 23:11) (83 - 95)  BP: 127/88 (11-24-24 @ 23:11) (127/88 - 129/79)  RR: 18 (11-24-24 @ 23:11) (18 - 18)  SpO2: 100% (11-24-24 @ 23:11) (98% - 100%)    CONSTITUTIONAL: Well groomed, no apparent distress  EYES: PERRLA and symmetric, EOMI, No conjunctival or scleral injection, non-icteric  ENMT: Oral mucosa with moist membranes. Normal dentition  RESP: No respiratory distress, no use of accessory muscles  CV: RRR  GI: Soft, TTP in the L periumbilical region and epigastrium, nondistended, no guarding, surgical sites are c/d/i without erythema or warmth  MSK: Normal ROM without pain  SKIN: No rashes or ulcers noted  NEURO: Grossly intact  PSYCH: Appropriate insight/judgment; A+O x 3, mood and affect appropriate, recent/remote memory intact

## 2024-11-26 ENCOUNTER — TRANSCRIPTION ENCOUNTER (OUTPATIENT)
Age: 49
End: 2024-11-26

## 2024-11-26 LAB
ANION GAP SERPL CALC-SCNC: 17 MMOL/L — SIGNIFICANT CHANGE UP (ref 5–17)
BASOPHILS # BLD AUTO: 0.02 K/UL — SIGNIFICANT CHANGE UP (ref 0–0.2)
BASOPHILS NFR BLD AUTO: 0.2 % — SIGNIFICANT CHANGE UP (ref 0–2)
BUN SERPL-MCNC: 16.2 MG/DL — SIGNIFICANT CHANGE UP (ref 8–20)
CALCIUM SERPL-MCNC: 8.6 MG/DL — SIGNIFICANT CHANGE UP (ref 8.4–10.5)
CHLORIDE SERPL-SCNC: 101 MMOL/L — SIGNIFICANT CHANGE UP (ref 96–108)
CO2 SERPL-SCNC: 20 MMOL/L — LOW (ref 22–29)
CREAT SERPL-MCNC: 0.68 MG/DL — SIGNIFICANT CHANGE UP (ref 0.5–1.3)
EGFR: 107 ML/MIN/1.73M2 — SIGNIFICANT CHANGE UP
EOSINOPHIL # BLD AUTO: 0.02 K/UL — SIGNIFICANT CHANGE UP (ref 0–0.5)
EOSINOPHIL NFR BLD AUTO: 0.2 % — SIGNIFICANT CHANGE UP (ref 0–6)
GLUCOSE SERPL-MCNC: 89 MG/DL — SIGNIFICANT CHANGE UP (ref 70–99)
HCG SERPL-ACNC: 5.2 MIU/ML — HIGH
HCT VFR BLD CALC: 35.2 % — SIGNIFICANT CHANGE UP (ref 34.5–45)
HGB BLD-MCNC: 12.1 G/DL — SIGNIFICANT CHANGE UP (ref 11.5–15.5)
IMM GRANULOCYTES NFR BLD AUTO: 0.2 % — SIGNIFICANT CHANGE UP (ref 0–0.9)
LYMPHOCYTES # BLD AUTO: 1.68 K/UL — SIGNIFICANT CHANGE UP (ref 1–3.3)
LYMPHOCYTES # BLD AUTO: 20.8 % — SIGNIFICANT CHANGE UP (ref 13–44)
MAGNESIUM SERPL-MCNC: 1.8 MG/DL — SIGNIFICANT CHANGE UP (ref 1.6–2.6)
MCHC RBC-ENTMCNC: 28.1 PG — SIGNIFICANT CHANGE UP (ref 27–34)
MCHC RBC-ENTMCNC: 34.4 G/DL — SIGNIFICANT CHANGE UP (ref 32–36)
MCV RBC AUTO: 81.7 FL — SIGNIFICANT CHANGE UP (ref 80–100)
MONOCYTES # BLD AUTO: 0.42 K/UL — SIGNIFICANT CHANGE UP (ref 0–0.9)
MONOCYTES NFR BLD AUTO: 5.2 % — SIGNIFICANT CHANGE UP (ref 2–14)
NEUTROPHILS # BLD AUTO: 5.92 K/UL — SIGNIFICANT CHANGE UP (ref 1.8–7.4)
NEUTROPHILS NFR BLD AUTO: 73.4 % — SIGNIFICANT CHANGE UP (ref 43–77)
PHOSPHATE SERPL-MCNC: 3.7 MG/DL — SIGNIFICANT CHANGE UP (ref 2.4–4.7)
PLATELET # BLD AUTO: 281 K/UL — SIGNIFICANT CHANGE UP (ref 150–400)
POTASSIUM SERPL-MCNC: 4.3 MMOL/L — SIGNIFICANT CHANGE UP (ref 3.5–5.3)
POTASSIUM SERPL-SCNC: 4.3 MMOL/L — SIGNIFICANT CHANGE UP (ref 3.5–5.3)
RBC # BLD: 4.31 M/UL — SIGNIFICANT CHANGE UP (ref 3.8–5.2)
RBC # FLD: 14.6 % — HIGH (ref 10.3–14.5)
SODIUM SERPL-SCNC: 138 MMOL/L — SIGNIFICANT CHANGE UP (ref 135–145)
WBC # BLD: 8.08 K/UL — SIGNIFICANT CHANGE UP (ref 3.8–10.5)
WBC # FLD AUTO: 8.08 K/UL — SIGNIFICANT CHANGE UP (ref 3.8–10.5)

## 2024-11-26 PROCEDURE — 74250 X-RAY XM SM INT 1CNTRST STD: CPT | Mod: 26

## 2024-11-26 PROCEDURE — 74018 RADEX ABDOMEN 1 VIEW: CPT | Mod: 26,59

## 2024-11-26 RX ORDER — ACETAMINOPHEN 500MG 500 MG/1
1000 TABLET, COATED ORAL EVERY 6 HOURS
Refills: 0 | Status: COMPLETED | OUTPATIENT
Start: 2024-11-26 | End: 2024-11-27

## 2024-11-26 RX ORDER — ACETAMINOPHEN 500MG 500 MG/1
1000 TABLET, COATED ORAL ONCE
Refills: 0 | Status: COMPLETED | OUTPATIENT
Start: 2024-11-26 | End: 2024-11-26

## 2024-11-26 RX ORDER — POLYETHYLENE GLYCOL 3350 17 G/17G
17 POWDER, FOR SOLUTION ORAL DAILY
Refills: 0 | Status: DISCONTINUED | OUTPATIENT
Start: 2024-11-26 | End: 2024-11-27

## 2024-11-26 RX ADMIN — TOPIRAMATE 25 MILLIGRAM(S): 25 TABLET ORAL at 21:25

## 2024-11-26 RX ADMIN — DEXAMETHASONE 4 MILLIGRAM(S): 1.5 TABLET ORAL at 18:12

## 2024-11-26 RX ADMIN — Medication 120 MILLILITER(S): at 05:59

## 2024-11-26 RX ADMIN — LISINOPRIL 5 MILLIGRAM(S): 20 TABLET ORAL at 09:34

## 2024-11-26 RX ADMIN — ACETAMINOPHEN 500MG 400 MILLIGRAM(S): 500 TABLET, COATED ORAL at 14:22

## 2024-11-26 RX ADMIN — DEXAMETHASONE 4 MILLIGRAM(S): 1.5 TABLET ORAL at 06:00

## 2024-11-26 RX ADMIN — Medication 25 GRAM(S): at 09:34

## 2024-11-26 RX ADMIN — Medication 0.12 MILLIGRAM(S): at 21:25

## 2024-11-26 RX ADMIN — POLYETHYLENE GLYCOL 3350 17 GRAM(S): 17 POWDER, FOR SOLUTION ORAL at 18:13

## 2024-11-26 RX ADMIN — ACETAMINOPHEN 500MG 1000 MILLIGRAM(S): 500 TABLET, COATED ORAL at 20:04

## 2024-11-26 RX ADMIN — ACETAMINOPHEN 500MG 400 MILLIGRAM(S): 500 TABLET, COATED ORAL at 20:04

## 2024-11-26 RX ADMIN — Medication 10 MILLIGRAM(S): at 18:13

## 2024-11-26 RX ADMIN — ENOXAPARIN SODIUM 40 MILLIGRAM(S): 30 INJECTION SUBCUTANEOUS at 06:00

## 2024-11-26 NOTE — DISCHARGE NOTE PROVIDER - HOSPITAL COURSE
Ms. Staton is a 50yo F with a PMHx of GERD, htn, migraines who is POD4 s/p nohelia en y gastric bypass presented to the ED abdominal pain x 2 days and vomiting x1 day and unable to tolerate her liquid rubi diet and had not had a BM since her surgery. The pt was made NPO on admission and an upper GI series was performed which showed nonobstructive bowel gas pattern with contrast to the rectum. Incidentally HCG was found to be elevated and the pt was notified to f/u with gyn outpatient. On hospital day 2, pt regained bowel function and was able to tolerate PO rubi clear diet. Pt was clinically stable to be discharged home and follow up outpatient in clinic.

## 2024-11-26 NOTE — DISCHARGE NOTE PROVIDER - NSDCCPCAREPLAN_GEN_ALL_CORE_FT
PRINCIPAL DISCHARGE DIAGNOSIS  Diagnosis: Ileus  Assessment and Plan of Treatment: You will need to follow-up with you gynecologist for elevated Beta-hcg level of 5.2. Follow-up within 2 weeks.     PRINCIPAL DISCHARGE DIAGNOSIS  Diagnosis: Ileus  Assessment and Plan of Treatment: You will need to follow-up with Dr. Costello from OB-GYN for elevated Beta-hcg level of 5.2. Follow-up within 2-4 weeks.

## 2024-11-26 NOTE — DISCHARGE NOTE PROVIDER - NSDCFUSCHEDAPPT_GEN_ALL_CORE_FT
Deny Rolle  [facility]  GENSURG [address]  Scheduled Appointment: 12/05/2024    Italo Serra  [facility]  NEUROLOGY [address]  Scheduled Appointment: 12/16/2024    Anjelica Saenz  [facility]  UROLOGY [address] [facility]  Scheduled Appointment: 01/02/2025    Colette Baker  [facility]  GENSURG [address]  Scheduled Appointment: 01/07/2025

## 2024-11-26 NOTE — PROGRESS NOTE ADULT - ATTENDING COMMENTS
Pain/nausea have improved   Plan for upper GI x-ray today  Elevated HCG noted - patient is status post hysterectomy. Will consult gyn/onc team to evaluate whether additional workup is indicated

## 2024-11-26 NOTE — DISCHARGE NOTE PROVIDER - CARE PROVIDER_API CALL
Carine Cross  Obstetrics and Gynecology  [address] [place] [id number]  Phone: [phone]  Fax: [phone]  Follow Up Time:    Carine Cross  Obstetrics and Gynecology  [address] [place] [id number]  Phone: [phone]  Fax: [phone]  Follow Up Time: 2 weeks

## 2024-11-27 ENCOUNTER — TRANSCRIPTION ENCOUNTER (OUTPATIENT)
Age: 49
End: 2024-11-27

## 2024-11-27 VITALS
TEMPERATURE: 98 F | RESPIRATION RATE: 17 BRPM | DIASTOLIC BLOOD PRESSURE: 82 MMHG | SYSTOLIC BLOOD PRESSURE: 119 MMHG | HEART RATE: 74 BPM | OXYGEN SATURATION: 99 %

## 2024-11-27 LAB
ANION GAP SERPL CALC-SCNC: 15 MMOL/L — SIGNIFICANT CHANGE UP (ref 5–17)
BUN SERPL-MCNC: 21.1 MG/DL — HIGH (ref 8–20)
CALCIUM SERPL-MCNC: 9.5 MG/DL — SIGNIFICANT CHANGE UP (ref 8.4–10.5)
CHLORIDE SERPL-SCNC: 102 MMOL/L — SIGNIFICANT CHANGE UP (ref 96–108)
CO2 SERPL-SCNC: 23 MMOL/L — SIGNIFICANT CHANGE UP (ref 22–29)
CREAT SERPL-MCNC: 0.8 MG/DL — SIGNIFICANT CHANGE UP (ref 0.5–1.3)
EGFR: 91 ML/MIN/1.73M2 — SIGNIFICANT CHANGE UP
GLUCOSE SERPL-MCNC: 102 MG/DL — HIGH (ref 70–99)
HCT VFR BLD CALC: 39.8 % — SIGNIFICANT CHANGE UP (ref 34.5–45)
HGB BLD-MCNC: 13.2 G/DL — SIGNIFICANT CHANGE UP (ref 11.5–15.5)
MAGNESIUM SERPL-MCNC: 2 MG/DL — SIGNIFICANT CHANGE UP (ref 1.8–2.6)
MCHC RBC-ENTMCNC: 27.6 PG — SIGNIFICANT CHANGE UP (ref 27–34)
MCHC RBC-ENTMCNC: 33.2 G/DL — SIGNIFICANT CHANGE UP (ref 32–36)
MCV RBC AUTO: 83.3 FL — SIGNIFICANT CHANGE UP (ref 80–100)
PHOSPHATE SERPL-MCNC: 3.9 MG/DL — SIGNIFICANT CHANGE UP (ref 2.4–4.7)
PLATELET # BLD AUTO: 371 K/UL — SIGNIFICANT CHANGE UP (ref 150–400)
POTASSIUM SERPL-MCNC: 4.1 MMOL/L — SIGNIFICANT CHANGE UP (ref 3.5–5.3)
POTASSIUM SERPL-SCNC: 4.1 MMOL/L — SIGNIFICANT CHANGE UP (ref 3.5–5.3)
RBC # BLD: 4.78 M/UL — SIGNIFICANT CHANGE UP (ref 3.8–5.2)
RBC # FLD: 15.1 % — HIGH (ref 10.3–14.5)
SODIUM SERPL-SCNC: 140 MMOL/L — SIGNIFICANT CHANGE UP (ref 135–145)
WBC # BLD: 11.67 K/UL — HIGH (ref 3.8–10.5)
WBC # FLD AUTO: 11.67 K/UL — HIGH (ref 3.8–10.5)

## 2024-11-27 PROCEDURE — T1013: CPT

## 2024-11-27 PROCEDURE — 74250 X-RAY XM SM INT 1CNTRST STD: CPT

## 2024-11-27 PROCEDURE — 85025 COMPLETE CBC W/AUTO DIFF WBC: CPT

## 2024-11-27 PROCEDURE — 74018 RADEX ABDOMEN 1 VIEW: CPT

## 2024-11-27 PROCEDURE — 96375 TX/PRO/DX INJ NEW DRUG ADDON: CPT

## 2024-11-27 PROCEDURE — 85610 PROTHROMBIN TIME: CPT

## 2024-11-27 PROCEDURE — 86900 BLOOD TYPING SEROLOGIC ABO: CPT

## 2024-11-27 PROCEDURE — 83690 ASSAY OF LIPASE: CPT

## 2024-11-27 PROCEDURE — 85027 COMPLETE CBC AUTOMATED: CPT

## 2024-11-27 PROCEDURE — 84702 CHORIONIC GONADOTROPIN TEST: CPT

## 2024-11-27 PROCEDURE — 85730 THROMBOPLASTIN TIME PARTIAL: CPT

## 2024-11-27 PROCEDURE — 86901 BLOOD TYPING SEROLOGIC RH(D): CPT

## 2024-11-27 PROCEDURE — 74177 CT ABD & PELVIS W/CONTRAST: CPT | Mod: MC

## 2024-11-27 PROCEDURE — 86850 RBC ANTIBODY SCREEN: CPT

## 2024-11-27 PROCEDURE — 96374 THER/PROPH/DIAG INJ IV PUSH: CPT

## 2024-11-27 PROCEDURE — 99285 EMERGENCY DEPT VISIT HI MDM: CPT

## 2024-11-27 PROCEDURE — 74019 RADEX ABDOMEN 2 VIEWS: CPT

## 2024-11-27 PROCEDURE — 93005 ELECTROCARDIOGRAM TRACING: CPT

## 2024-11-27 PROCEDURE — 83735 ASSAY OF MAGNESIUM: CPT

## 2024-11-27 PROCEDURE — 84100 ASSAY OF PHOSPHORUS: CPT

## 2024-11-27 PROCEDURE — 80053 COMPREHEN METABOLIC PANEL: CPT

## 2024-11-27 PROCEDURE — 80048 BASIC METABOLIC PNL TOTAL CA: CPT

## 2024-11-27 PROCEDURE — 36415 COLL VENOUS BLD VENIPUNCTURE: CPT

## 2024-11-27 RX ADMIN — LISINOPRIL 5 MILLIGRAM(S): 20 TABLET ORAL at 09:16

## 2024-11-27 RX ADMIN — ACETAMINOPHEN 500MG 400 MILLIGRAM(S): 500 TABLET, COATED ORAL at 05:37

## 2024-11-27 RX ADMIN — ACETAMINOPHEN 500MG 1000 MILLIGRAM(S): 500 TABLET, COATED ORAL at 00:07

## 2024-11-27 RX ADMIN — ACETAMINOPHEN 500MG 1000 MILLIGRAM(S): 500 TABLET, COATED ORAL at 05:43

## 2024-11-27 RX ADMIN — ENOXAPARIN SODIUM 40 MILLIGRAM(S): 30 INJECTION SUBCUTANEOUS at 05:38

## 2024-11-27 RX ADMIN — ACETAMINOPHEN 500MG 400 MILLIGRAM(S): 500 TABLET, COATED ORAL at 00:00

## 2024-11-27 RX ADMIN — DEXAMETHASONE 4 MILLIGRAM(S): 1.5 TABLET ORAL at 05:37

## 2024-11-27 NOTE — PROGRESS NOTE ADULT - SUBJECTIVE AND OBJECTIVE BOX
INTERVAL HPI/OVERNIGHT EVENTS/SUBJECTIVE:  Patient is POD 6 RA gastric bypass, readmitted with abdominal pain and nausea. Pt has not been nauseous overnight or this AM, states her abdominal pain occurs when she moves or ambulates. planned for UGIS this AM. tentatively booked for OR tomorrow for diagnostic lap, possible revision of JJ anastamosis    ICU Vital Signs Last 24 Hrs  T(C): 36.5 (26 Nov 2024 05:05), Max: 36.9 (25 Nov 2024 16:44)  T(F): 97.7 (26 Nov 2024 05:05), Max: 98.5 (25 Nov 2024 20:57)  HR: 66 (26 Nov 2024 05:05) (65 - 73)  BP: 109/71 (26 Nov 2024 05:05) (101/66 - 110/74)  BP(mean): --  ABP: --  ABP(mean): --  RR: 18 (26 Nov 2024 05:05) (17 - 18)  SpO2: 96% (26 Nov 2024 05:05) (95% - 98%)    O2 Parameters below as of 26 Nov 2024 05:05  Patient On (Oxygen Delivery Method): room air            I&O's Detail    25 Nov 2024 07:01  -  26 Nov 2024 07:00  --------------------------------------------------------  IN:    Lactated Ringers: 2880 mL  Total IN: 2880 mL    OUT:    Voided (mL): 450 mL  Total OUT: 450 mL    Total NET: 2430 mL      MEDICATIONS  (STANDING):  dexAMETHasone  Injectable 4 milliGRAM(s) IV Push every 12 hours  enoxaparin Injectable 40 milliGRAM(s) SubCutaneous every 24 hours  influenza   Vaccine 0.5 milliLiter(s) IntraMuscular once  lactated ringers. 1000 milliLiter(s) (120 mL/Hr) IV Continuous <Continuous>  lisinopril 5 milliGRAM(s) Oral daily  magnesium sulfate  IVPB 2 Gram(s) IV Intermittent once  topiramate 25 milliGRAM(s) Oral at bedtime    MEDICATIONS  (PRN):  hyoscyamine SL 0.125 milliGRAM(s) SubLingual every 4 hours PRN gastric spasm  ondansetron Injectable 4 milliGRAM(s) IV Push every 6 hours PRN Nausea and/or Vomiting      MISC:     PHYSICAL EXAM:    Gen: NAD, laying comfortably  Neurological: AAOx3  Pulmonary: non-labored  Gastrointestinal: soft, mild ttp llq, no peritoneal signs  Genitourinary: voiding  Extremities: DESAI    LABS:  CBC Full  -  ( 26 Nov 2024 04:58 )  WBC Count : 8.08 K/uL  RBC Count : 4.31 M/uL  Hemoglobin : 12.1 g/dL  Hematocrit : 35.2 %  Platelet Count - Automated : 281 K/uL  Mean Cell Volume : 81.7 fl  Mean Cell Hemoglobin : 28.1 pg  Mean Cell Hemoglobin Concentration : 34.4 g/dL  Auto Neutrophil # : 5.92 K/uL  Auto Lymphocyte # : 1.68 K/uL  Auto Monocyte # : 0.42 K/uL  Auto Eosinophil # : 0.02 K/uL  Auto Basophil # : 0.02 K/uL  Auto Neutrophil % : 73.4 %  Auto Lymphocyte % : 20.8 %  Auto Monocyte % : 5.2 %  Auto Eosinophil % : 0.2 %  Auto Basophil % : 0.2 %    11-26    138  |  101  |  16.2  ----------------------------<  89  4.3   |  20.0[L]  |  0.68    Ca    8.6      26 Nov 2024 04:58  Phos  3.7     11-26  Mg     1.8     11-26    TPro  6.8  /  Alb  3.6  /  TBili  0.4  /  DBili  x   /  AST  26  /  ALT  80[H]  /  AlkPhos  113  11-25    PT/INR - ( 25 Nov 2024 07:17 )   PT: 13.0 sec;   INR: 1.12 ratio         PTT - ( 25 Nov 2024 07:17 )  PTT:38.8 sec  Urinalysis Basic - ( 26 Nov 2024 04:58 )    Color: x / Appearance: x / SG: x / pH: x  Gluc: 89 mg/dL / Ketone: x  / Bili: x / Urobili: x   Blood: x / Protein: x / Nitrite: x   Leuk Esterase: x / RBC: x / WBC x   Sq Epi: x / Non Sq Epi: x / Bacteria: x      LIVER FUNCTIONS - ( 25 Nov 2024 07:17 )  Alb: 3.6 g/dL / Pro: 6.8 g/dL / ALK PHOS: 113 U/L / ALT: 80 U/L / AST: 26 U/L / GGT: x             ASSESSMENT/PLAN:  49yFemale, Patient is POD 6 RA gastric bypass, readmitted with abdominal pain and nausea. Pt has not been nauseous overnight or this AM, states her abdominal pain occurs when she moves or ambulates. planned for UGIS this AM. tentatively booked for OR tomorrow for diagnostic lap, possible revision of JJ anastamosis  -UGIS today  -NPO, IVF  -continue meds  -tentatively booked for OR tomorrow  -pre-op  -dvt ppx, SCD's  -encourage oob, ambulate
Subjective: Patient seen and examined at bedside. No overnight events. Patient states feeling well, tolerating clears, having bowel function- watery diarrhaea. Patient denies HA, dizziness, SOB, N/V, Abdominal pain.        MEDICATIONS  (STANDING):  acetaminophen   IVPB .. 1000 milliGRAM(s) IV Intermittent every 6 hours  dexAMETHasone  Injectable 4 milliGRAM(s) IV Push every 12 hours  enoxaparin Injectable 40 milliGRAM(s) SubCutaneous every 24 hours  influenza   Vaccine 0.5 milliLiter(s) IntraMuscular once  lisinopril 5 milliGRAM(s) Oral daily  polyethylene glycol 3350 17 Gram(s) Oral daily  topiramate 25 milliGRAM(s) Oral at bedtime    MEDICATIONS  (PRN):  hyoscyamine SL 0.125 milliGRAM(s) SubLingual every 4 hours PRN gastric spasm  ondansetron Injectable 4 milliGRAM(s) IV Push every 6 hours PRN Nausea and/or Vomiting      Vital Signs Last 24 Hrs  T(C): 36.6 (27 Nov 2024 05:00), Max: 37.1 (26 Nov 2024 23:57)  T(F): 97.8 (27 Nov 2024 05:00), Max: 98.7 (26 Nov 2024 23:57)  HR: 82 (27 Nov 2024 05:00) (63 - 82)  BP: 108/72 (27 Nov 2024 05:00) (106/67 - 118/80)  BP(mean): --  RR: 18 (27 Nov 2024 05:00) (17 - 18)  SpO2: 97% (27 Nov 2024 05:00) (94% - 98%)    Parameters below as of 27 Nov 2024 05:00  Patient On (Oxygen Delivery Method): room air        PHYSICAL EXAM:  Gen: NAD, laying comfortably  Neurological: AAOx3  Pulmonary: non-labored  Gastrointestinal: soft, mild ttp llq, no peritoneal signs  Genitourinary: voiding  Extremities: DESAI      LABS:                        13.2   11.67 )-----------( 371      ( 27 Nov 2024 04:06 )             39.8     11-27    140  |  102  |  21.1[H]  ----------------------------<  102[H]  4.1   |  23.0  |  0.80    Ca    9.5      27 Nov 2024 04:06  Phos  3.9     11-27  Mg     2.0     11-27    TPro  6.8  /  Alb  3.6  /  TBili  0.4  /  DBili  x   /  AST  26  /  ALT  80[H]  /  AlkPhos  113  11-25    PT/INR - ( 25 Nov 2024 07:17 )   PT: 13.0 sec;   INR: 1.12 ratio         PTT - ( 25 Nov 2024 07:17 )  PTT:38.8 sec  Urinalysis Basic - ( 27 Nov 2024 04:06 )    Color: x / Appearance: x / SG: x / pH: x  Gluc: 102 mg/dL / Ketone: x  / Bili: x / Urobili: x   Blood: x / Protein: x / Nitrite: x   Leuk Esterase: x / RBC: x / WBC x   Sq Epi: x / Non Sq Epi: x / Bacteria: x        A: 49yFemale, Patient is POD 6 RA gastric bypass, readmitted with abdominal pain and nausea. Pt has not been nauseous overnight or this AM, states her abdominal pain occurs when she moves or ambulates. planned for UGIS this AM. tentatively booked for OR tomorrow for diagnostic lap, possible revision of JJ anastamosis. Upper GIS negative from yesterday- OR canceled for today.     Plan:  -Saad martinez   -continue meds  -pre-op  -dvt ppx, SCD's  -encourage oob, ambulate

## 2024-11-27 NOTE — DISCHARGE NOTE NURSING/CASE MANAGEMENT/SOCIAL WORK - PATIENT PORTAL LINK FT
You can access the FollowMyHealth Patient Portal offered by [facility] by registering at the following website: [email or web address]. By joining [email or web address] FollowMyHealth portal, you will also be able to view your health information using other applications (apps) compatible with our system.

## 2024-11-27 NOTE — PROGRESS NOTE ADULT - ATTENDING COMMENTS
Prior 24 hours events reviewed   Patient reports multiple BM. Pain and nausea has resolved  Will plan for discharge home  Followup recommended with GYN

## 2024-11-27 NOTE — DISCHARGE NOTE NURSING/CASE MANAGEMENT/SOCIAL WORK - FINANCIAL ASSISTANCE
[facility] provides services at a reduced cost to those who are determined to be eligible through [facility]’s financial assistance program. Information regarding [facility]’s financial assistance program can be found by going to [email or web address] or by calling [phone].

## 2024-11-27 NOTE — CHART NOTE - NSCHARTNOTEFT_GEN_A_CORE
Spoke with Gyn team regarding elevated beta-hcg. Her hcg was 4.9 and today 5.2. Gyn states this could be normal and recommends f/u with gyn outpatient, states nothing to do while inpatient.
If pt is obstructed or has nausea during upper GI study, please do flouro guided NGT.
Met with pt and visitor who assisted with translation per pt's request this morning. Chart and events reviewed, pt s/p RA gastric bypass 6 days ago, readmitted with abdominal pain x 2 days and vomiting x 1 day. Upper UGIS negative.  Pt provided with Nigerien and english handouts for Nutrition Guidelines After Gastric Bypass Surgery and reviewed key points with pt. She states her daughter is her support person and is helping her with dietary transitions. Pt requested outpatient bariatric RD information, advised phone number is at the bottom of provided handout. She is tolerating clear liquid diet this morning, had watery diarrhea after taking laxative per pt, now improving. Made aware RD remains available for any nutrition related questions or concerns.

## 2024-12-04 ENCOUNTER — LABORATORY RESULT (OUTPATIENT)
Age: 49
End: 2024-12-04

## 2024-12-04 ENCOUNTER — APPOINTMENT (OUTPATIENT)
Dept: OBGYN | Facility: CLINIC | Age: 49
End: 2024-12-04
Payer: COMMERCIAL

## 2024-12-04 VITALS
SYSTOLIC BLOOD PRESSURE: 112 MMHG | DIASTOLIC BLOOD PRESSURE: 68 MMHG | HEIGHT: 64 IN | WEIGHT: 177.38 LBS | BODY MASS INDEX: 30.28 KG/M2

## 2024-12-04 DIAGNOSIS — Z72.3 LACK OF PHYSICAL EXERCISE: ICD-10-CM

## 2024-12-04 DIAGNOSIS — Z12.4 ENCOUNTER FOR SCREENING FOR MALIGNANT NEOPLASM OF CERVIX: ICD-10-CM

## 2024-12-04 DIAGNOSIS — R92.30 DENSE BREASTS, UNSPECIFIED: ICD-10-CM

## 2024-12-04 DIAGNOSIS — Z01.411 ENCOUNTER FOR GYNECOLOGICAL EXAMINATION (GENERAL) (ROUTINE) WITH ABNORMAL FINDINGS: ICD-10-CM

## 2024-12-04 DIAGNOSIS — Z12.39 ENCOUNTER FOR OTHER SCREENING FOR MALIGNANT NEOPLASM OF BREAST: ICD-10-CM

## 2024-12-04 PROCEDURE — 99459 PELVIC EXAMINATION: CPT

## 2024-12-04 PROCEDURE — 99386 PREV VISIT NEW AGE 40-64: CPT | Mod: 25

## 2024-12-04 PROCEDURE — 99203 OFFICE O/P NEW LOW 30 MIN: CPT | Mod: 25

## 2024-12-05 ENCOUNTER — ASOB RESULT (OUTPATIENT)
Age: 49
End: 2024-12-05

## 2024-12-05 ENCOUNTER — APPOINTMENT (OUTPATIENT)
Dept: OBGYN | Facility: CLINIC | Age: 49
End: 2024-12-05
Payer: COMMERCIAL

## 2024-12-05 ENCOUNTER — APPOINTMENT (OUTPATIENT)
Dept: ANTEPARTUM | Facility: CLINIC | Age: 49
End: 2024-12-05
Payer: COMMERCIAL

## 2024-12-05 ENCOUNTER — APPOINTMENT (OUTPATIENT)
Dept: SURGERY | Facility: CLINIC | Age: 49
End: 2024-12-05
Payer: COMMERCIAL

## 2024-12-05 VITALS
WEIGHT: 177 LBS | BODY MASS INDEX: 30.22 KG/M2 | DIASTOLIC BLOOD PRESSURE: 66 MMHG | SYSTOLIC BLOOD PRESSURE: 112 MMHG | HEIGHT: 64 IN

## 2024-12-05 VITALS
HEART RATE: 65 BPM | SYSTOLIC BLOOD PRESSURE: 104 MMHG | WEIGHT: 176.4 LBS | TEMPERATURE: 97.6 F | BODY MASS INDEX: 30.11 KG/M2 | OXYGEN SATURATION: 99 % | HEIGHT: 64 IN | RESPIRATION RATE: 16 BRPM | DIASTOLIC BLOOD PRESSURE: 69 MMHG

## 2024-12-05 DIAGNOSIS — R79.89 OTHER SPECIFIED ABNORMAL FINDINGS OF BLOOD CHEMISTRY: ICD-10-CM

## 2024-12-05 PROCEDURE — 99024 POSTOP FOLLOW-UP VISIT: CPT

## 2024-12-05 PROCEDURE — 99213 OFFICE O/P EST LOW 20 MIN: CPT | Mod: 25

## 2024-12-05 PROCEDURE — 76830 TRANSVAGINAL US NON-OB: CPT

## 2024-12-06 LAB
CANCER AG125 SERPL-ACNC: 26 U/ML
HCG SERPL-MCNC: 5 MIU/ML
HPV HIGH+LOW RISK DNA PNL CVX: DETECTED

## 2024-12-09 LAB
CA 125 (LABCORP): 26.5 U/ML
HE4X: 45.3 PMOL/L
POSTMENOPAUSAL ROMA: 1.51
PREMENOPAUSAL ROMA: 0.62
ROMA COMMENT: NORMAL

## 2024-12-12 ENCOUNTER — APPOINTMENT (OUTPATIENT)
Dept: SURGERY | Facility: CLINIC | Age: 49
End: 2024-12-12
Payer: COMMERCIAL

## 2024-12-12 VITALS
HEIGHT: 64 IN | RESPIRATION RATE: 14 BRPM | SYSTOLIC BLOOD PRESSURE: 111 MMHG | HEART RATE: 59 BPM | BODY MASS INDEX: 29.39 KG/M2 | TEMPERATURE: 97.9 F | WEIGHT: 172.13 LBS | OXYGEN SATURATION: 98 % | DIASTOLIC BLOOD PRESSURE: 77 MMHG

## 2024-12-12 PROCEDURE — 99024 POSTOP FOLLOW-UP VISIT: CPT

## 2024-12-13 LAB — CYTOLOGY CVX/VAG DOC THIN PREP: ABNORMAL

## 2024-12-16 ENCOUNTER — APPOINTMENT (OUTPATIENT)
Dept: NEUROLOGY | Facility: CLINIC | Age: 49
End: 2024-12-16
Payer: COMMERCIAL

## 2024-12-16 ENCOUNTER — NON-APPOINTMENT (OUTPATIENT)
Age: 49
End: 2024-12-16

## 2024-12-16 VITALS
WEIGHT: 173 LBS | SYSTOLIC BLOOD PRESSURE: 120 MMHG | HEART RATE: 101 BPM | BODY MASS INDEX: 29.53 KG/M2 | HEIGHT: 64 IN | DIASTOLIC BLOOD PRESSURE: 79 MMHG

## 2024-12-16 DIAGNOSIS — G43.909 MIGRAINE, UNSPECIFIED, NOT INTRACTABLE, W/OUT STATUS MIGRAINOSUS: ICD-10-CM

## 2024-12-16 PROCEDURE — 99213 OFFICE O/P EST LOW 20 MIN: CPT

## 2024-12-16 PROCEDURE — G2211 COMPLEX E/M VISIT ADD ON: CPT

## 2024-12-16 RX ORDER — AZELASTINE HYDROCHLORIDE 137 UG/1
137 SPRAY, METERED NASAL
Qty: 30 | Refills: 0 | Status: ACTIVE | COMMUNITY
Start: 2024-12-06

## 2024-12-28 ENCOUNTER — EMERGENCY (EMERGENCY)
Facility: HOSPITAL | Age: 49
LOS: 1 days | Discharge: HOME | End: 2024-12-28
Attending: EMERGENCY MEDICINE
Payer: COMMERCIAL

## 2024-12-28 VITALS
DIASTOLIC BLOOD PRESSURE: 79 MMHG | OXYGEN SATURATION: 99 % | TEMPERATURE: 98 F | SYSTOLIC BLOOD PRESSURE: 120 MMHG | HEART RATE: 74 BPM | RESPIRATION RATE: 20 BRPM

## 2024-12-28 VITALS
TEMPERATURE: 98 F | HEIGHT: 64 IN | HEART RATE: 97 BPM | RESPIRATION RATE: 20 BRPM | OXYGEN SATURATION: 98 % | WEIGHT: 169.76 LBS | DIASTOLIC BLOOD PRESSURE: 90 MMHG | SYSTOLIC BLOOD PRESSURE: 129 MMHG

## 2024-12-28 DIAGNOSIS — Z98.84 BARIATRIC SURGERY STATUS: Chronic | ICD-10-CM

## 2024-12-28 LAB
ALBUMIN SERPL ELPH-MCNC: 3.9 G/DL — SIGNIFICANT CHANGE UP (ref 3.3–5.2)
ALP SERPL-CCNC: 129 U/L — HIGH (ref 40–120)
ALT FLD-CCNC: 22 U/L — SIGNIFICANT CHANGE UP
ANION GAP SERPL CALC-SCNC: 15 MMOL/L — SIGNIFICANT CHANGE UP (ref 5–17)
APPEARANCE UR: CLEAR — SIGNIFICANT CHANGE UP
AST SERPL-CCNC: 23 U/L — SIGNIFICANT CHANGE UP
BACTERIA # UR AUTO: NEGATIVE /HPF — SIGNIFICANT CHANGE UP
BASOPHILS # BLD AUTO: 0.02 K/UL — SIGNIFICANT CHANGE UP (ref 0–0.2)
BASOPHILS NFR BLD AUTO: 0.2 % — SIGNIFICANT CHANGE UP (ref 0–2)
BILIRUB SERPL-MCNC: 0.4 MG/DL — SIGNIFICANT CHANGE UP (ref 0.4–2)
BILIRUB UR-MCNC: NEGATIVE — SIGNIFICANT CHANGE UP
BLD GP AB SCN SERPL QL: SIGNIFICANT CHANGE UP
BUN SERPL-MCNC: 17.5 MG/DL — SIGNIFICANT CHANGE UP (ref 8–20)
CALCIUM SERPL-MCNC: 9 MG/DL — SIGNIFICANT CHANGE UP (ref 8.4–10.5)
CAST: 0 /LPF — SIGNIFICANT CHANGE UP (ref 0–4)
CHLORIDE SERPL-SCNC: 103 MMOL/L — SIGNIFICANT CHANGE UP (ref 96–108)
CO2 SERPL-SCNC: 23 MMOL/L — SIGNIFICANT CHANGE UP (ref 22–29)
COLOR SPEC: ABNORMAL
CREAT SERPL-MCNC: 0.73 MG/DL — SIGNIFICANT CHANGE UP (ref 0.5–1.3)
DIFF PNL FLD: NEGATIVE — SIGNIFICANT CHANGE UP
EGFR: 101 ML/MIN/1.73M2 — SIGNIFICANT CHANGE UP
EGFR: 101 ML/MIN/1.73M2 — SIGNIFICANT CHANGE UP
EOSINOPHIL # BLD AUTO: 0.12 K/UL — SIGNIFICANT CHANGE UP (ref 0–0.5)
EOSINOPHIL NFR BLD AUTO: 1.4 % — SIGNIFICANT CHANGE UP (ref 0–6)
GLUCOSE SERPL-MCNC: 85 MG/DL — SIGNIFICANT CHANGE UP (ref 70–99)
GLUCOSE UR QL: NEGATIVE MG/DL — SIGNIFICANT CHANGE UP
HCG SERPL-ACNC: 4.2 MIU/ML — HIGH
HCG UR QL: NEGATIVE — SIGNIFICANT CHANGE UP
HCT VFR BLD CALC: 39 % — SIGNIFICANT CHANGE UP (ref 34.5–45)
HGB BLD-MCNC: 13.1 G/DL — SIGNIFICANT CHANGE UP (ref 11.5–15.5)
IMM GRANULOCYTES NFR BLD AUTO: 0.2 % — SIGNIFICANT CHANGE UP (ref 0–0.9)
KETONES UR-MCNC: 15 MG/DL
LEUKOCYTE ESTERASE UR-ACNC: ABNORMAL
LIDOCAIN IGE QN: 63 U/L — HIGH (ref 22–51)
LYMPHOCYTES # BLD AUTO: 2.82 K/UL — SIGNIFICANT CHANGE UP (ref 1–3.3)
LYMPHOCYTES # BLD AUTO: 33.9 % — SIGNIFICANT CHANGE UP (ref 13–44)
MCHC RBC-ENTMCNC: 27.9 PG — SIGNIFICANT CHANGE UP (ref 27–34)
MCHC RBC-ENTMCNC: 33.6 G/DL — SIGNIFICANT CHANGE UP (ref 32–36)
MCV RBC AUTO: 83.2 FL — SIGNIFICANT CHANGE UP (ref 80–100)
MONOCYTES # BLD AUTO: 0.48 K/UL — SIGNIFICANT CHANGE UP (ref 0–0.9)
MONOCYTES NFR BLD AUTO: 5.8 % — SIGNIFICANT CHANGE UP (ref 2–14)
NEUTROPHILS # BLD AUTO: 4.86 K/UL — SIGNIFICANT CHANGE UP (ref 1.8–7.4)
NEUTROPHILS NFR BLD AUTO: 58.5 % — SIGNIFICANT CHANGE UP (ref 43–77)
NITRITE UR-MCNC: NEGATIVE — SIGNIFICANT CHANGE UP
PH UR: 6 — SIGNIFICANT CHANGE UP (ref 5–8)
PLATELET # BLD AUTO: 291 K/UL — SIGNIFICANT CHANGE UP (ref 150–400)
POTASSIUM SERPL-MCNC: 3.5 MMOL/L — SIGNIFICANT CHANGE UP (ref 3.5–5.3)
POTASSIUM SERPL-SCNC: 3.5 MMOL/L — SIGNIFICANT CHANGE UP (ref 3.5–5.3)
PROT SERPL-MCNC: 6.9 G/DL — SIGNIFICANT CHANGE UP (ref 6.6–8.7)
PROT UR-MCNC: NEGATIVE MG/DL — SIGNIFICANT CHANGE UP
RBC # BLD: 4.69 M/UL — SIGNIFICANT CHANGE UP (ref 3.8–5.2)
RBC # FLD: 15.4 % — HIGH (ref 10.3–14.5)
RBC CASTS # UR COMP ASSIST: 0 /HPF — SIGNIFICANT CHANGE UP (ref 0–4)
SODIUM SERPL-SCNC: 141 MMOL/L — SIGNIFICANT CHANGE UP (ref 135–145)
SP GR SPEC: 1.01 — SIGNIFICANT CHANGE UP (ref 1–1.03)
SQUAMOUS # UR AUTO: 1 /HPF — SIGNIFICANT CHANGE UP (ref 0–5)
UROBILINOGEN FLD QL: 1 MG/DL — SIGNIFICANT CHANGE UP (ref 0.2–1)
WBC # BLD: 8.32 K/UL — SIGNIFICANT CHANGE UP (ref 3.8–10.5)
WBC # FLD AUTO: 8.32 K/UL — SIGNIFICANT CHANGE UP (ref 3.8–10.5)
WBC UR QL: 7 /HPF — HIGH (ref 0–5)

## 2024-12-28 RX ORDER — ACETAMINOPHEN 500 MG/5ML
1000 LIQUID (ML) ORAL ONCE
Refills: 0 | Status: COMPLETED | OUTPATIENT
Start: 2024-12-28 | End: 2024-12-28

## 2024-12-28 RX ADMIN — Medication 400 MILLIGRAM(S): at 20:01

## 2024-12-28 RX ADMIN — Medication 1000 MILLILITER(S): at 20:01

## 2024-12-29 RX ORDER — ACETAMINOPHEN 500 MG/5ML
2 LIQUID (ML) ORAL
Qty: 30 | Refills: 0
Start: 2024-12-29 | End: 2025-01-02

## 2024-12-29 RX ORDER — CEPHALEXIN 250 MG/1
500 CAPSULE ORAL ONCE
Refills: 0 | Status: COMPLETED | OUTPATIENT
Start: 2024-12-29 | End: 2024-12-29

## 2024-12-29 RX ORDER — CEPHALEXIN 250 MG/1
1 CAPSULE ORAL
Qty: 21 | Refills: 0
Start: 2024-12-29 | End: 2025-01-04

## 2024-12-29 RX ORDER — ACETAMINOPHEN 500 MG/5ML
3 LIQUID (ML) ORAL
Qty: 30 | Refills: 0
Start: 2024-12-29

## 2024-12-29 RX ADMIN — CEPHALEXIN 500 MILLIGRAM(S): 250 CAPSULE ORAL at 03:41

## 2024-12-31 ENCOUNTER — APPOINTMENT (OUTPATIENT)
Dept: OBGYN | Facility: CLINIC | Age: 49
End: 2024-12-31
Payer: COMMERCIAL

## 2024-12-31 VITALS
WEIGHT: 173 LBS | BODY MASS INDEX: 29.53 KG/M2 | SYSTOLIC BLOOD PRESSURE: 126 MMHG | DIASTOLIC BLOOD PRESSURE: 84 MMHG | HEIGHT: 64 IN

## 2024-12-31 DIAGNOSIS — R87.620 ATYPICAL SQUAMOUS CELLS OF UNDETERMINED SIGNIFICANCE ON CYTOLOGIC SMEAR OF VAGINA (ASC-US): ICD-10-CM

## 2024-12-31 DIAGNOSIS — R87.811 ATYPICAL SQUAMOUS CELLS OF UNDETERMINED SIGNIFICANCE ON CYTOLOGIC SMEAR OF VAGINA (ASC-US): ICD-10-CM

## 2024-12-31 LAB
HCG UR QL: NEGATIVE
QUALITY CONTROL: YES

## 2024-12-31 PROCEDURE — 81025 URINE PREGNANCY TEST: CPT

## 2024-12-31 PROCEDURE — 57452 EXAM OF CERVIX W/SCOPE: CPT

## 2025-01-02 ENCOUNTER — APPOINTMENT (OUTPATIENT)
Dept: UROLOGY | Facility: CLINIC | Age: 50
End: 2025-01-02
Payer: COMMERCIAL

## 2025-01-02 VITALS
HEIGHT: 64 IN | BODY MASS INDEX: 28.34 KG/M2 | DIASTOLIC BLOOD PRESSURE: 82 MMHG | SYSTOLIC BLOOD PRESSURE: 116 MMHG | WEIGHT: 166 LBS | HEART RATE: 67 BPM

## 2025-01-02 DIAGNOSIS — N28.1 CYST OF KIDNEY, ACQUIRED: ICD-10-CM

## 2025-01-02 DIAGNOSIS — N20.0 CALCULUS OF KIDNEY: ICD-10-CM

## 2025-01-02 PROCEDURE — 99213 OFFICE O/P EST LOW 20 MIN: CPT | Mod: 25

## 2025-01-02 RX ORDER — CEPHALEXIN 500 MG/1
500 CAPSULE ORAL
Refills: 0 | Status: ACTIVE | COMMUNITY
Start: 2025-01-02

## 2025-01-09 ENCOUNTER — APPOINTMENT (OUTPATIENT)
Dept: SURGERY | Facility: CLINIC | Age: 50
End: 2025-01-09
Payer: COMMERCIAL

## 2025-01-09 VITALS
BODY MASS INDEX: 28.48 KG/M2 | SYSTOLIC BLOOD PRESSURE: 105 MMHG | WEIGHT: 166.8 LBS | TEMPERATURE: 98.1 F | DIASTOLIC BLOOD PRESSURE: 71 MMHG | HEART RATE: 79 BPM | OXYGEN SATURATION: 99 % | HEIGHT: 64 IN | RESPIRATION RATE: 16 BRPM

## 2025-01-09 DIAGNOSIS — R13.10 DYSPHAGIA, UNSPECIFIED: ICD-10-CM

## 2025-01-09 PROCEDURE — 99024 POSTOP FOLLOW-UP VISIT: CPT

## 2025-01-16 ENCOUNTER — APPOINTMENT (OUTPATIENT)
Dept: UROLOGY | Facility: CLINIC | Age: 50
End: 2025-01-16
Payer: COMMERCIAL

## 2025-01-16 DIAGNOSIS — R30.0 DYSURIA: ICD-10-CM

## 2025-01-16 DIAGNOSIS — R82.90 UNSPECIFIED ABNORMAL FINDINGS IN URINE: ICD-10-CM

## 2025-01-16 LAB
APPEARANCE: CLEAR
BILIRUBIN URINE: NEGATIVE
BLOOD URINE: NEGATIVE
COLOR: YELLOW
GLUCOSE QUALITATIVE U: NEGATIVE
KETONES URINE: NEGATIVE
LEUKOCYTE ESTERASE URINE: ABNORMAL
NITRITE URINE: NEGATIVE
PH URINE: 6
PROTEIN URINE: NEGATIVE
SPECIFIC GRAVITY URINE: 1.01
UROBILINOGEN URINE: 0.2 (ref 0.2–?)

## 2025-01-16 PROCEDURE — 99213 OFFICE O/P EST LOW 20 MIN: CPT | Mod: 25

## 2025-01-17 ENCOUNTER — OUTPATIENT (OUTPATIENT)
Dept: OUTPATIENT SERVICES | Facility: HOSPITAL | Age: 50
LOS: 1 days | End: 2025-01-17
Payer: COMMERCIAL

## 2025-01-17 DIAGNOSIS — Z98.84 BARIATRIC SURGERY STATUS: Chronic | ICD-10-CM

## 2025-01-17 DIAGNOSIS — N20.0 CALCULUS OF KIDNEY: ICD-10-CM

## 2025-01-17 PROCEDURE — 74018 RADEX ABDOMEN 1 VIEW: CPT | Mod: 26

## 2025-01-21 DIAGNOSIS — N39.0 URINARY TRACT INFECTION, SITE NOT SPECIFIED: ICD-10-CM

## 2025-01-21 LAB — BACTERIA UR CULT: ABNORMAL

## 2025-01-21 RX ORDER — CEFUROXIME AXETIL 500 MG/1
500 TABLET, FILM COATED ORAL
Qty: 10 | Refills: 0 | Status: ACTIVE | COMMUNITY
Start: 2025-01-21 | End: 1900-01-01

## 2025-01-21 RX ORDER — CIPROFLOXACIN HYDROCHLORIDE 500 MG/1
500 TABLET, FILM COATED ORAL TWICE DAILY
Qty: 20 | Refills: 0 | Status: DISCONTINUED | COMMUNITY
Start: 2025-01-16 | End: 2025-01-21

## 2025-01-23 ENCOUNTER — OUTPATIENT (OUTPATIENT)
Dept: OUTPATIENT SERVICES | Facility: HOSPITAL | Age: 50
LOS: 1 days | End: 2025-01-23
Payer: COMMERCIAL

## 2025-01-23 DIAGNOSIS — R13.10 DYSPHAGIA, UNSPECIFIED: ICD-10-CM

## 2025-01-23 DIAGNOSIS — Z98.84 BARIATRIC SURGERY STATUS: Chronic | ICD-10-CM

## 2025-01-23 PROCEDURE — 74240 X-RAY XM UPR GI TRC 1CNTRST: CPT

## 2025-01-23 PROCEDURE — 74240 X-RAY XM UPR GI TRC 1CNTRST: CPT | Mod: 26

## 2025-01-28 ENCOUNTER — APPOINTMENT (OUTPATIENT)
Dept: SURGERY | Facility: CLINIC | Age: 50
End: 2025-01-28
Payer: COMMERCIAL

## 2025-01-28 VITALS
HEART RATE: 69 BPM | RESPIRATION RATE: 16 BRPM | HEIGHT: 64 IN | BODY MASS INDEX: 28.1 KG/M2 | OXYGEN SATURATION: 98 % | DIASTOLIC BLOOD PRESSURE: 73 MMHG | TEMPERATURE: 98.3 F | SYSTOLIC BLOOD PRESSURE: 108 MMHG | WEIGHT: 164.6 LBS

## 2025-01-28 DIAGNOSIS — Z98.84 BARIATRIC SURGERY STATUS: ICD-10-CM

## 2025-01-28 PROCEDURE — 99214 OFFICE O/P EST MOD 30 MIN: CPT

## 2025-01-28 RX ORDER — OMEPRAZOLE 40 MG/1
40 CAPSULE, DELAYED RELEASE ORAL
Qty: 30 | Refills: 1 | Status: ACTIVE | COMMUNITY
Start: 2025-01-28 | End: 1900-01-01

## 2025-01-29 LAB
BASOPHILS # BLD AUTO: 0.03 K/UL
BASOPHILS NFR BLD AUTO: 0.5 %
EOSINOPHIL # BLD AUTO: 0.16 K/UL
EOSINOPHIL NFR BLD AUTO: 2.5 %
ESTIMATED AVERAGE GLUCOSE: 108 MG/DL
HBA1C MFR BLD HPLC: 5.4 %
HCT VFR BLD CALC: 39.3 %
HGB BLD-MCNC: 12.8 G/DL
IMM GRANULOCYTES NFR BLD AUTO: 0.2 %
LYMPHOCYTES # BLD AUTO: 2.38 K/UL
LYMPHOCYTES NFR BLD AUTO: 37.2 %
MAN DIFF?: NORMAL
MCHC RBC-ENTMCNC: 27.7 PG
MCHC RBC-ENTMCNC: 32.6 G/DL
MCV RBC AUTO: 85.1 FL
MONOCYTES # BLD AUTO: 0.37 K/UL
MONOCYTES NFR BLD AUTO: 5.8 %
NEUTROPHILS # BLD AUTO: 3.45 K/UL
NEUTROPHILS NFR BLD AUTO: 53.8 %
PLATELET # BLD AUTO: 296 K/UL
RBC # BLD: 4.62 M/UL
RBC # FLD: 15.2 %
WBC # FLD AUTO: 6.4 K/UL

## 2025-01-30 ENCOUNTER — APPOINTMENT (OUTPATIENT)
Dept: UROLOGY | Facility: CLINIC | Age: 50
End: 2025-01-30
Payer: COMMERCIAL

## 2025-01-30 VITALS — HEART RATE: 65 BPM | SYSTOLIC BLOOD PRESSURE: 130 MMHG | DIASTOLIC BLOOD PRESSURE: 85 MMHG

## 2025-01-30 PROCEDURE — 99212 OFFICE O/P EST SF 10 MIN: CPT | Mod: 25

## 2025-02-04 LAB
25(OH)D3 SERPL-MCNC: 41.6 NG/ML
A-TOCOPHEROL VIT E SERPL-MCNC: 10.1 MG/L
ALBUMIN SERPL ELPH-MCNC: 4 G/DL
ALP BLD-CCNC: 130 U/L
ALT SERPL-CCNC: 33 U/L
ANION GAP SERPL CALC-SCNC: 12 MMOL/L
AST SERPL-CCNC: 27 U/L
BETA+GAMMA TOCOPHEROL SERPL-MCNC: 0.5 MG/L
BILIRUB DIRECT SERPL-MCNC: 0.1 MG/DL
BILIRUB INDIRECT SERPL-MCNC: 0.4 MG/DL
BILIRUB SERPL-MCNC: 0.5 MG/DL
BUN SERPL-MCNC: 15 MG/DL
CA-I SERPL-SCNC: 5.1 MG/DL
CALCIUM SERPL-MCNC: 9.5 MG/DL
CALCIUM SERPL-MCNC: 9.5 MG/DL
CHLORIDE SERPL-SCNC: 108 MMOL/L
CHOLEST SERPL-MCNC: 153 MG/DL
CO2 SERPL-SCNC: 24 MMOL/L
COPPER SERPL-MCNC: 99 UG/DL
CREAT SERPL-MCNC: 0.86 MG/DL
EGFR: 83 ML/MIN/1.73M2
FERRITIN SERPL-MCNC: 17 NG/ML
FOLATE SERPL-MCNC: 15.4 NG/ML
GLUCOSE SERPL-MCNC: 87 MG/DL
HDLC SERPL-MCNC: 51 MG/DL
IRON SATN MFR SERPL: 21 %
IRON SERPL-MCNC: 67 UG/DL
LDLC SERPL CALC-MCNC: 90 MG/DL
MAGNESIUM SERPL-MCNC: 1.7 MG/DL
NONHDLC SERPL-MCNC: 102 MG/DL
PARATHYROID HORMONE INTACT: 52 PG/ML
PHOSPHATE SERPL-MCNC: 4.5 MG/DL
POTASSIUM SERPL-SCNC: 4.1 MMOL/L
PREALB SERPL NEPH-MCNC: 22 MG/DL
PROT SERPL-MCNC: 6.5 G/DL
SODIUM SERPL-SCNC: 144 MMOL/L
T4 SERPL-MCNC: 5.8 UG/DL
TIBC SERPL-MCNC: 322 UG/DL
TRIGL SERPL-MCNC: 62 MG/DL
TSH SERPL-ACNC: 0.94 UIU/ML
UIBC SERPL-MCNC: 256 UG/DL
VIT A SERPL-MCNC: 41 UG/DL
VIT B1 SERPL-MCNC: 150 NMOL/L
VIT B12 SERPL-MCNC: 1343 PG/ML
VIT B2 SERPL-MCNC: 328 UG/L
VIT B6 SERPL-MCNC: 27 UG/L
VIT C SERPL-MCNC: 1.1 MG/DL
ZINC SERPL-MCNC: 78 UG/DL

## 2025-02-05 NOTE — ED ADULT TRIAGE NOTE - HISTORY OF COVID-19 VACCINATION
Spoke with patient today in regard to smoking cessation progress for 6 month telephone follow up. Patient states she is still vaping and not interested in scheduling an appointment at this time. Informed patient of benefit period, future follow ups, and contact information if any further help or support is needed. Will complete smart form for 3/6 month follow up on Quit attempt #1.    Yes

## 2025-02-07 ENCOUNTER — APPOINTMENT (OUTPATIENT)
Dept: GASTROENTEROLOGY | Facility: CLINIC | Age: 50
End: 2025-02-07
Payer: COMMERCIAL

## 2025-02-07 ENCOUNTER — NON-APPOINTMENT (OUTPATIENT)
Age: 50
End: 2025-02-07

## 2025-02-07 VITALS
BODY MASS INDEX: 27.83 KG/M2 | SYSTOLIC BLOOD PRESSURE: 120 MMHG | DIASTOLIC BLOOD PRESSURE: 88 MMHG | HEIGHT: 64 IN | WEIGHT: 163 LBS

## 2025-02-07 DIAGNOSIS — Z01.818 ENCOUNTER FOR OTHER PREPROCEDURAL EXAMINATION: ICD-10-CM

## 2025-02-07 DIAGNOSIS — Z98.84 BARIATRIC SURGERY STATUS: ICD-10-CM

## 2025-02-07 DIAGNOSIS — R13.10 DYSPHAGIA, UNSPECIFIED: ICD-10-CM

## 2025-02-07 DIAGNOSIS — R12 HEARTBURN: ICD-10-CM

## 2025-02-07 LAB — MENADIONE SERPL-MCNC: 0.19 NG/ML

## 2025-02-07 PROCEDURE — 99214 OFFICE O/P EST MOD 30 MIN: CPT

## 2025-02-09 PROBLEM — R12 HEARTBURN: Status: ACTIVE | Noted: 2025-02-09

## 2025-02-09 PROBLEM — Z98.84 GASTRIC BYPASS STATUS FOR OBESITY: Status: ACTIVE | Noted: 2025-02-09

## 2025-02-09 PROBLEM — R13.10 DYSPHAGIA: Status: ACTIVE | Noted: 2025-02-09

## 2025-02-10 ENCOUNTER — OUTPATIENT (OUTPATIENT)
Dept: OUTPATIENT SERVICES | Facility: HOSPITAL | Age: 50
LOS: 1 days | End: 2025-02-10
Payer: COMMERCIAL

## 2025-02-10 ENCOUNTER — APPOINTMENT (OUTPATIENT)
Dept: RADIOLOGY | Facility: CLINIC | Age: 50
End: 2025-02-10
Payer: COMMERCIAL

## 2025-02-10 DIAGNOSIS — Z98.84 BARIATRIC SURGERY STATUS: Chronic | ICD-10-CM

## 2025-02-10 DIAGNOSIS — N20.0 CALCULUS OF KIDNEY: ICD-10-CM

## 2025-02-10 PROCEDURE — 74018 RADEX ABDOMEN 1 VIEW: CPT

## 2025-02-10 PROCEDURE — 74018 RADEX ABDOMEN 1 VIEW: CPT | Mod: 26

## 2025-02-14 NOTE — ASU PREOP CHECKLIST - SURGICAL CONSENT
MR HIP LEFT WO



HISTORY: Left hip pain



COMPARISON: None



TECHNIQUE: MRI of the left hip  was performed utilizing multiple pulse

sequences in axial, coronal and sagittal planes. Patient was not given

contrast through intravenous route.



FINDINGS: The study is limited due to paramagnetic susceptibility

artifacts caused by orthopedic fixation plates and screws. This is a

suboptimal study. Left hip joint cannot be well evaluated. No definite

MR evidence of bone edema is seen in this limited study.



IMPRESSION:

1. Suboptimal study due to extensive artifacts from orthopedic fixation

plates and screws of the left proximal femur. done

## 2025-02-26 ENCOUNTER — APPOINTMENT (OUTPATIENT)
Dept: GASTROENTEROLOGY | Facility: AMBULATORY MEDICAL SERVICES | Age: 50
End: 2025-02-26
Payer: COMMERCIAL

## 2025-02-26 ENCOUNTER — RESULT REVIEW (OUTPATIENT)
Age: 50
End: 2025-02-26

## 2025-02-26 PROCEDURE — 43239 EGD BIOPSY SINGLE/MULTIPLE: CPT

## 2025-03-20 ENCOUNTER — APPOINTMENT (OUTPATIENT)
Dept: SURGERY | Facility: CLINIC | Age: 50
End: 2025-03-20
Payer: COMMERCIAL

## 2025-03-20 VITALS
HEART RATE: 78 BPM | WEIGHT: 162 LBS | SYSTOLIC BLOOD PRESSURE: 122 MMHG | DIASTOLIC BLOOD PRESSURE: 77 MMHG | TEMPERATURE: 97.5 F | OXYGEN SATURATION: 100 % | HEIGHT: 64 IN | BODY MASS INDEX: 27.66 KG/M2 | RESPIRATION RATE: 16 BRPM

## 2025-03-20 DIAGNOSIS — K28.9 GASTROJEJUNAL ULCER, UNSPECIFIED AS ACUTE OR CHRONIC, W/OUT HEMORRHAGE OR PERFORATION: ICD-10-CM

## 2025-03-20 PROCEDURE — 99214 OFFICE O/P EST MOD 30 MIN: CPT

## 2025-03-20 RX ORDER — SUCRALFATE 1 G/1
1 TABLET ORAL 4 TIMES DAILY
Qty: 120 | Refills: 2 | Status: ACTIVE | COMMUNITY
Start: 2025-03-20 | End: 1900-01-01

## 2025-04-29 ENCOUNTER — APPOINTMENT (OUTPATIENT)
Dept: SURGERY | Facility: CLINIC | Age: 50
End: 2025-04-29

## 2025-04-30 ENCOUNTER — APPOINTMENT (OUTPATIENT)
Dept: UROLOGY | Facility: CLINIC | Age: 50
End: 2025-04-30
Payer: COMMERCIAL

## 2025-04-30 DIAGNOSIS — N20.0 CALCULUS OF KIDNEY: ICD-10-CM

## 2025-04-30 DIAGNOSIS — R82.90 UNSPECIFIED ABNORMAL FINDINGS IN URINE: ICD-10-CM

## 2025-04-30 DIAGNOSIS — R30.0 DYSURIA: ICD-10-CM

## 2025-04-30 DIAGNOSIS — N89.8 OTHER SPECIFIED NONINFLAMMATORY DISORDERS OF VAGINA: ICD-10-CM

## 2025-04-30 LAB
APPEARANCE: CLEAR
BILIRUBIN URINE: NEGATIVE
BLOOD URINE: ABNORMAL
COLOR: YELLOW
GLUCOSE QUALITATIVE U: NEGATIVE
KETONES URINE: NEGATIVE
LEUKOCYTE ESTERASE URINE: ABNORMAL
NITRITE URINE: NEGATIVE
PH URINE: 6
PROTEIN URINE: 30
SPECIFIC GRAVITY URINE: 1.01
UROBILINOGEN URINE: 0.2 (ref 0.2–?)

## 2025-04-30 PROCEDURE — 99214 OFFICE O/P EST MOD 30 MIN: CPT

## 2025-04-30 RX ORDER — NITROFURANTOIN (MONOHYDRATE/MACROCRYSTALS) 25; 75 MG/1; MG/1
100 CAPSULE ORAL
Qty: 10 | Refills: 0 | Status: ACTIVE | COMMUNITY
Start: 2025-04-30 | End: 1900-01-01

## 2025-04-30 RX ORDER — ESTRADIOL 0.1 MG/G
0.1 CREAM VAGINAL
Qty: 1 | Refills: 2 | Status: ACTIVE | COMMUNITY
Start: 2025-04-30 | End: 1900-01-01

## 2025-05-05 LAB — BACTERIA UR CULT: ABNORMAL

## 2025-05-13 ENCOUNTER — APPOINTMENT (OUTPATIENT)
Dept: ULTRASOUND IMAGING | Facility: CLINIC | Age: 50
End: 2025-05-13

## 2025-05-13 ENCOUNTER — OUTPATIENT (OUTPATIENT)
Dept: OUTPATIENT SERVICES | Facility: HOSPITAL | Age: 50
LOS: 1 days | End: 2025-05-13
Payer: COMMERCIAL

## 2025-05-13 DIAGNOSIS — N20.0 CALCULUS OF KIDNEY: ICD-10-CM

## 2025-05-13 DIAGNOSIS — Z98.84 BARIATRIC SURGERY STATUS: Chronic | ICD-10-CM

## 2025-05-13 PROCEDURE — 76775 US EXAM ABDO BACK WALL LIM: CPT | Mod: 26

## 2025-05-15 ENCOUNTER — OFFICE (OUTPATIENT)
Dept: URBAN - METROPOLITAN AREA CLINIC 63 | Facility: CLINIC | Age: 50
Setting detail: OPHTHALMOLOGY
End: 2025-05-15
Payer: COMMERCIAL

## 2025-05-15 DIAGNOSIS — H25.13: ICD-10-CM

## 2025-05-15 DIAGNOSIS — H04.123: ICD-10-CM

## 2025-05-15 DIAGNOSIS — H40.013: ICD-10-CM

## 2025-05-15 PROCEDURE — 92083 EXTENDED VISUAL FIELD XM: CPT | Performed by: INTERNAL MEDICINE

## 2025-05-15 PROCEDURE — 99213 OFFICE O/P EST LOW 20 MIN: CPT | Performed by: INTERNAL MEDICINE

## 2025-05-15 ASSESSMENT — REFRACTION_CURRENTRX
OS_SPHERE: +0.50
OD_AXIS: 131
OD_ADD: +1.75
OS_AXIS: 088
OD_OVR_VA: 20/
OS_CYLINDER: -0.75
OD_CYLINDER: -0.25
OS_VPRISM_DIRECTION: SV
OS_OVR_VA: 20/
OS_ADD: +1.75
OD_SPHERE: PLANO
OD_VPRISM_DIRECTION: SV

## 2025-05-15 ASSESSMENT — CONFRONTATIONAL VISUAL FIELD TEST (CVF)
OS_FINDINGS: FULL
OD_FINDINGS: FULL

## 2025-05-15 ASSESSMENT — REFRACTION_MANIFEST
OD_VA1: 20/20
OD_SPHERE: +0.50
OS_VA1: 20/20
OS_ADD: +1.75
OS_AXIS: 095
OS_SPHERE: +0.50
OD_CYLINDER: -0.50
OD_ADD: +1.75
OD_AXIS: 125
OS_CYLINDER: -0.50

## 2025-05-15 ASSESSMENT — TEAR BREAK UP TIME (TBUT)
OS_TBUT: T
OD_TBUT: T

## 2025-05-15 ASSESSMENT — KERATOMETRY
OD_AXISANGLE_DEGREES: 074
OD_K1POWER_DIOPTERS: 44.75
OS_AXISANGLE_DEGREES: 082
OS_K2POWER_DIOPTERS: 46.00
OD_K2POWER_DIOPTERS: 45.75
OS_K1POWER_DIOPTERS: 45.75

## 2025-05-15 ASSESSMENT — SUPERFICIAL PUNCTATE KERATITIS (SPK)
OD_SPK: T
OS_SPK: T

## 2025-05-15 ASSESSMENT — PACHYMETRY
OS_CT_UM: 580
OD_CT_UM: 610
OD_CT_CORRECTION: -5
OS_CT_CORRECTION: -3

## 2025-05-15 ASSESSMENT — REFRACTION_AUTOREFRACTION
OS_AXIS: 093
OS_CYLINDER: -0.75
OD_CYLINDER: -0.50
OD_SPHERE: +0.75
OS_SPHERE: +1.25
OD_AXIS: 107

## 2025-05-15 ASSESSMENT — TONOMETRY
OS_IOP_MMHG: 17
OD_IOP_MMHG: 18

## 2025-05-15 ASSESSMENT — VISUAL ACUITY
OD_BCVA: 20/20
OS_BCVA: 20/20

## 2025-05-21 ENCOUNTER — APPOINTMENT (OUTPATIENT)
Dept: UROLOGY | Facility: CLINIC | Age: 50
End: 2025-05-21
Payer: COMMERCIAL

## 2025-05-21 ENCOUNTER — LABORATORY RESULT (OUTPATIENT)
Age: 50
End: 2025-05-21

## 2025-05-21 VITALS
HEIGHT: 64 IN | DIASTOLIC BLOOD PRESSURE: 91 MMHG | SYSTOLIC BLOOD PRESSURE: 145 MMHG | HEART RATE: 65 BPM | BODY MASS INDEX: 27.66 KG/M2 | WEIGHT: 162 LBS

## 2025-05-21 PROCEDURE — 99214 OFFICE O/P EST MOD 30 MIN: CPT | Mod: 25

## 2025-05-22 ENCOUNTER — APPOINTMENT (OUTPATIENT)
Dept: SURGERY | Facility: CLINIC | Age: 50
End: 2025-05-22

## 2025-05-22 LAB
APPEARANCE: CLEAR
BILIRUBIN URINE: NEGATIVE
BLOOD URINE: NEGATIVE
COLOR: YELLOW
GLUCOSE QUALITATIVE U: NEGATIVE MG/DL
KETONES URINE: NEGATIVE MG/DL
LEUKOCYTE ESTERASE URINE: ABNORMAL
NITRITE URINE: NEGATIVE
PH URINE: 7
PROTEIN URINE: NEGATIVE MG/DL
SPECIFIC GRAVITY URINE: 1.02
UROBILINOGEN URINE: 0.2 MG/DL

## 2025-05-29 DIAGNOSIS — N39.0 URINARY TRACT INFECTION, SITE NOT SPECIFIED: ICD-10-CM

## 2025-05-29 RX ORDER — CEPHALEXIN 500 MG/1
500 CAPSULE ORAL
Qty: 10 | Refills: 0 | Status: ACTIVE | COMMUNITY
Start: 2025-05-29 | End: 1900-01-01

## 2025-06-16 ENCOUNTER — OUTPATIENT (OUTPATIENT)
Dept: OUTPATIENT SERVICES | Facility: HOSPITAL | Age: 50
LOS: 1 days | End: 2025-06-16
Payer: COMMERCIAL

## 2025-06-16 ENCOUNTER — APPOINTMENT (OUTPATIENT)
Dept: MRI IMAGING | Facility: CLINIC | Age: 50
End: 2025-06-16

## 2025-06-16 DIAGNOSIS — Z98.84 BARIATRIC SURGERY STATUS: Chronic | ICD-10-CM

## 2025-06-16 DIAGNOSIS — N28.1 CYST OF KIDNEY, ACQUIRED: ICD-10-CM

## 2025-06-16 PROCEDURE — 74183 MRI ABD W/O CNTR FLWD CNTR: CPT | Mod: 26

## 2025-06-17 ENCOUNTER — APPOINTMENT (OUTPATIENT)
Dept: NEUROLOGY | Facility: CLINIC | Age: 50
End: 2025-06-17
Payer: COMMERCIAL

## 2025-06-17 VITALS
DIASTOLIC BLOOD PRESSURE: 100 MMHG | HEART RATE: 71 BPM | SYSTOLIC BLOOD PRESSURE: 155 MMHG | BODY MASS INDEX: 27.66 KG/M2 | HEIGHT: 64 IN | WEIGHT: 162 LBS

## 2025-06-17 PROCEDURE — 99213 OFFICE O/P EST LOW 20 MIN: CPT

## 2025-06-17 RX ORDER — AMITRIPTYLINE HYDROCHLORIDE 25 MG/1
25 TABLET, FILM COATED ORAL
Qty: 90 | Refills: 1 | Status: ACTIVE | COMMUNITY
Start: 2025-06-17 | End: 1900-01-01

## 2025-06-24 ENCOUNTER — LABORATORY RESULT (OUTPATIENT)
Age: 50
End: 2025-06-24

## 2025-06-24 ENCOUNTER — APPOINTMENT (OUTPATIENT)
Dept: UROLOGY | Facility: CLINIC | Age: 50
End: 2025-06-24
Payer: COMMERCIAL

## 2025-06-24 VITALS
BODY MASS INDEX: 27.49 KG/M2 | HEIGHT: 65 IN | DIASTOLIC BLOOD PRESSURE: 83 MMHG | HEART RATE: 84 BPM | WEIGHT: 165 LBS | SYSTOLIC BLOOD PRESSURE: 146 MMHG

## 2025-06-24 PROBLEM — N28.1 BILATERAL RENAL CYSTS: Status: ACTIVE | Noted: 2025-06-24

## 2025-06-24 PROBLEM — N39.0 FREQUENT UTI: Status: ACTIVE | Noted: 2025-06-24

## 2025-06-24 PROCEDURE — 99213 OFFICE O/P EST LOW 20 MIN: CPT

## 2025-06-24 RX ORDER — LISINOPRIL 2.5 MG/1
2.5 TABLET ORAL
Refills: 0 | Status: ACTIVE | COMMUNITY
Start: 2025-06-24

## 2025-06-25 LAB
APPEARANCE: CLEAR
BILIRUBIN URINE: NEGATIVE
BLOOD URINE: NEGATIVE
COLOR: YELLOW
GLUCOSE QUALITATIVE U: NEGATIVE MG/DL
KETONES URINE: NEGATIVE MG/DL
LEUKOCYTE ESTERASE URINE: ABNORMAL
NITRITE URINE: NEGATIVE
PH URINE: 6
PROTEIN URINE: NEGATIVE MG/DL
SPECIFIC GRAVITY URINE: 1.01
UROBILINOGEN URINE: 0.2 MG/DL

## 2025-06-25 RX ORDER — NITROFURANTOIN (MONOHYDRATE/MACROCRYSTALS) 25; 75 MG/1; MG/1
100 CAPSULE ORAL
Qty: 10 | Refills: 0 | Status: ACTIVE | COMMUNITY
Start: 2025-06-25 | End: 1900-01-01

## 2025-06-29 LAB — BACTERIA UR CULT: ABNORMAL

## 2025-08-26 ENCOUNTER — APPOINTMENT (OUTPATIENT)
Dept: UROLOGY | Facility: CLINIC | Age: 50
End: 2025-08-26
Payer: COMMERCIAL

## 2025-08-26 VITALS
BODY MASS INDEX: 27.16 KG/M2 | HEART RATE: 76 BPM | WEIGHT: 163 LBS | DIASTOLIC BLOOD PRESSURE: 89 MMHG | SYSTOLIC BLOOD PRESSURE: 133 MMHG | HEIGHT: 65 IN

## 2025-08-26 DIAGNOSIS — R35.1 NOCTURIA: ICD-10-CM

## 2025-08-26 DIAGNOSIS — R30.0 DYSURIA: ICD-10-CM

## 2025-08-26 LAB
BILIRUB UR QL STRIP: NEGATIVE
GLUCOSE UR-MCNC: NEGATIVE
HCG UR QL: 0.2 EU/DL
HGB UR QL STRIP.AUTO: ABNORMAL
KETONES UR-MCNC: NEGATIVE
LEUKOCYTE ESTERASE UR QL STRIP: ABNORMAL
NITRITE UR QL STRIP: NEGATIVE
PH UR STRIP: 6
PROT UR STRIP-MCNC: ABNORMAL
SP GR UR STRIP: 1.02

## 2025-08-26 PROCEDURE — 99213 OFFICE O/P EST LOW 20 MIN: CPT | Mod: 25

## 2025-08-26 PROCEDURE — 81002 URINALYSIS NONAUTO W/O SCOPE: CPT

## 2025-08-27 PROBLEM — R35.1 NOCTURIA: Status: ACTIVE | Noted: 2025-08-27

## 2025-08-29 DIAGNOSIS — N76.0 ACUTE VAGINITIS: ICD-10-CM

## 2025-08-29 DIAGNOSIS — B96.89 ACUTE VAGINITIS: ICD-10-CM

## 2025-08-29 DIAGNOSIS — N39.0 URINARY TRACT INFECTION, SITE NOT SPECIFIED: ICD-10-CM

## 2025-08-29 RX ORDER — METRONIDAZOLE 7.5 MG/G
0.75 GEL VAGINAL
Qty: 1 | Refills: 0 | Status: ACTIVE | COMMUNITY
Start: 2025-08-29 | End: 1900-01-01

## 2025-08-29 RX ORDER — NITROFURANTOIN (MONOHYDRATE/MACROCRYSTALS) 25; 75 MG/1; MG/1
100 CAPSULE ORAL
Qty: 10 | Refills: 0 | Status: ACTIVE | COMMUNITY
Start: 2025-08-29 | End: 1900-01-01

## (undated) DEVICE — GOWN ROYAL SILK XL

## (undated) DEVICE — SUT ETHIBOND 2-0 30" SH

## (undated) DEVICE — DEFENDO AIR/WATER, SUCTION BIOPSY CONN KIT DISP

## (undated) DEVICE — DRAPE TOWEL BLUE 17" X 24"

## (undated) DEVICE — XI VESSEL SEALER

## (undated) DEVICE — NDL HYPO SAFE 22G X 1.5" (BLACK)

## (undated) DEVICE — XI CORD MONOPOLAR CAUTERY (GREEN)

## (undated) DEVICE — TAPE SILK 3"

## (undated) DEVICE — PREP CHLORAPREP HI-LITE ORANGE 26ML

## (undated) DEVICE — ELCTR CORD FOOTSWITCH 1PLR LAPSCP 10FT

## (undated) DEVICE — IRRIGATION TRAY W PISTON SYRINGE 60ML

## (undated) DEVICE — XI SEAL UNIVERSIAL 5-12MM

## (undated) DEVICE — VENODYNE/SCD SLEEVE CALF LARGE

## (undated) DEVICE — XI ARM FORCEP CADIERE 8MM

## (undated) DEVICE — SUT VICRYL 3-0 27" CT-2 UNDYED

## (undated) DEVICE — TROCAR COVIDIEN VERSAPORT BLADELESS OPTICAL 5MM STANDARD

## (undated) DEVICE — XI OBTURATOR OPTICAL BLADELESS 8MM

## (undated) DEVICE — XI DRAPE COLUMN

## (undated) DEVICE — ELCTR BOVIE PENCIL SMOKE EVACUATION 15FT

## (undated) DEVICE — XI TIP COVER

## (undated) DEVICE — ELCTR GROUNDING PAD ADULT COVIDIEN

## (undated) DEVICE — FORCEP ENDOJAW AGTR LC W NDL 2.8MM 230CM DISP

## (undated) DEVICE — COVIDIEN SIGNIA POWER CONTROL SHELL

## (undated) DEVICE — XI STAPLER SUREFORM 60

## (undated) DEVICE — GLV 8 PROTEXIS (BLUE)

## (undated) DEVICE — D HELP - CLEARVIEW CLEARIFY SYSTEM

## (undated) DEVICE — VALVE ENDOSCOPE DEFENDO SINGLE USE

## (undated) DEVICE — POSITIONER FOAM EGG CRATE ULNAR 2PCS (PINK)

## (undated) DEVICE — SOL IRR POUR H2O 1000ML

## (undated) DEVICE — DRAPE XL SHEET 77X98"

## (undated) DEVICE — SUT CLIP LAPRA-TY ABSORBABLE SIZE 0.118 TO 0.12" VIOLET

## (undated) DEVICE — INSUFFLATION NDL COVIDIEN SURGINEEDLE VERESS 120MM

## (undated) DEVICE — XI DRAPE ARM

## (undated) DEVICE — GOWN TRIMAX LG

## (undated) DEVICE — VISIGI 3D ROUX-EN-Y 32FR

## (undated) DEVICE — DRAPE TOWEL BLUE STICKY

## (undated) DEVICE — ENDOCATCH II 15MM

## (undated) DEVICE — STAPLER SKIN PROXIMATE

## (undated) DEVICE — SOL IRR POUR NS 0.9% 1000ML

## (undated) DEVICE — XI ARM SCISSOR MONO CURVED

## (undated) DEVICE — DRAPE GENERAL ENDOSCOPY

## (undated) DEVICE — GLV 7.5 PROTEXIS (WHITE)

## (undated) DEVICE — PACK ROBOTIC

## (undated) DEVICE — XI CORD BIPOLAR CAUTERY (BLUE)

## (undated) DEVICE — POSITIONER SAGE MOBILITY TRANSFER PAD

## (undated) DEVICE — SUT MONOCRYL 4-0 18" PS-2

## (undated) DEVICE — XI ARM NEEDLE DRIVER SUTURECUT MEGA 8MM

## (undated) DEVICE — DRSG DERMABOND 0.7ML

## (undated) DEVICE — SUT MONOCRYL PLUS 4-0 18" PS-2 UNDYED

## (undated) DEVICE — WARMING BLANKET UPPER ADULT